# Patient Record
Sex: FEMALE | Race: WHITE | Employment: UNEMPLOYED | URBAN - METROPOLITAN AREA
[De-identification: names, ages, dates, MRNs, and addresses within clinical notes are randomized per-mention and may not be internally consistent; named-entity substitution may affect disease eponyms.]

---

## 2017-05-09 PROBLEM — R22.0 RIGHT FACIAL SWELLING: Status: ACTIVE | Noted: 2017-05-09

## 2018-03-01 PROBLEM — Z51.89 VISIT FOR WOUND CARE: Status: ACTIVE | Noted: 2018-03-01

## 2018-03-01 PROBLEM — R10.84 GENERALIZED ABDOMINAL PAIN: Status: ACTIVE | Noted: 2018-03-01

## 2018-03-01 PROBLEM — Z98.51 STATUS POST TUBAL LIGATION: Status: ACTIVE | Noted: 2018-03-01

## 2018-03-01 PROBLEM — R55 SYNCOPE AND COLLAPSE: Status: ACTIVE | Noted: 2018-03-01

## 2018-11-08 PROBLEM — T14.91XA SUICIDE ATTEMPT, INITIAL ENCOUNTER (HCC): Status: ACTIVE | Noted: 2018-11-08

## 2018-11-08 PROBLEM — T14.91XA SUICIDE ATTEMPT (HCC): Status: ACTIVE | Noted: 2018-11-08

## 2019-03-08 PROBLEM — T50.901A DRUG OVERDOSE, ACCIDENTAL OR UNINTENTIONAL, INITIAL ENCOUNTER: Status: ACTIVE | Noted: 2019-03-08

## 2019-03-10 PROBLEM — F32.A DEPRESSION WITH SUICIDAL IDEATION: Status: ACTIVE | Noted: 2019-03-10

## 2019-03-10 PROBLEM — R45.851 DEPRESSION WITH SUICIDAL IDEATION: Status: ACTIVE | Noted: 2019-03-10

## 2019-03-11 PROBLEM — F33.9 MDD (MAJOR DEPRESSIVE DISORDER), RECURRENT EPISODE (HCC): Status: ACTIVE | Noted: 2019-03-11

## 2019-04-18 PROBLEM — N70.11 HYDROSALPINX: Status: ACTIVE | Noted: 2019-04-18

## 2019-08-16 PROBLEM — T50.902A INTENTIONAL DRUG OVERDOSE (HCC): Status: ACTIVE | Noted: 2019-08-16

## 2019-11-15 PROBLEM — F32.9 MAJOR DEPRESSION, CHRONIC: Status: ACTIVE | Noted: 2019-11-15

## 2019-12-18 PROBLEM — Q44.5 BILE DUCT, COMMON, CYSTIC DILATATION: Status: ACTIVE | Noted: 2019-12-18

## 2021-04-30 PROBLEM — T50.905A DRUG-INDUCED TORSADES DE POINTES: Status: ACTIVE | Noted: 2021-04-30

## 2021-04-30 PROBLEM — I49.9 VENTRICULAR ARRHYTHMIA: Status: ACTIVE | Noted: 2021-04-30

## 2021-04-30 PROBLEM — I47.21 DRUG-INDUCED TORSADES DE POINTES: Status: ACTIVE | Noted: 2021-04-30

## 2022-02-21 PROBLEM — N20.0 RENAL CALCULI: Status: ACTIVE | Noted: 2022-02-21

## 2022-02-21 PROBLEM — N20.1 URETERAL CALCULI: Status: ACTIVE | Noted: 2022-02-21

## 2022-03-01 PROBLEM — R10.9 INTRACTABLE ABDOMINAL PAIN: Status: ACTIVE | Noted: 2022-03-01

## 2022-03-10 PROBLEM — N12 PYELONEPHRITIS: Status: ACTIVE | Noted: 2022-03-10

## 2022-03-14 PROBLEM — R10.9 INTRACTABLE ABDOMINAL PAIN: Status: ACTIVE | Noted: 2022-03-14

## 2022-06-21 PROBLEM — R19.7 DIARRHEA: Status: ACTIVE | Noted: 2022-06-21

## 2022-06-21 PROBLEM — R11.0 NAUSEA: Status: ACTIVE | Noted: 2022-06-21

## 2022-10-13 PROBLEM — R10.31 RIGHT LOWER QUADRANT ABDOMINAL PAIN: Status: ACTIVE | Noted: 2022-10-13

## 2022-11-28 ENCOUNTER — APPOINTMENT (OUTPATIENT)
Dept: ULTRASOUND IMAGING | Age: 32
End: 2022-11-28
Payer: COMMERCIAL

## 2022-11-28 ENCOUNTER — APPOINTMENT (OUTPATIENT)
Dept: GENERAL RADIOLOGY | Age: 32
End: 2022-11-28
Payer: COMMERCIAL

## 2022-11-28 ENCOUNTER — HOSPITAL ENCOUNTER (EMERGENCY)
Age: 32
Discharge: LEFT BEFORE DISCHARGE | End: 2022-11-28
Payer: COMMERCIAL

## 2022-11-28 VITALS
HEIGHT: 63 IN | WEIGHT: 196 LBS | TEMPERATURE: 97.7 F | HEART RATE: 100 BPM | OXYGEN SATURATION: 97 % | DIASTOLIC BLOOD PRESSURE: 75 MMHG | BODY MASS INDEX: 34.73 KG/M2 | RESPIRATION RATE: 20 BRPM | SYSTOLIC BLOOD PRESSURE: 114 MMHG

## 2022-11-28 DIAGNOSIS — J06.9 ACUTE UPPER RESPIRATORY INFECTION: ICD-10-CM

## 2022-11-28 DIAGNOSIS — R10.11 RIGHT UPPER QUADRANT ABDOMINAL PAIN: Primary | ICD-10-CM

## 2022-11-28 LAB
ALBUMIN SERPL-MCNC: 3.7 G/DL (ref 3.5–5.2)
ALP BLD-CCNC: 46 U/L (ref 35–104)
ALT SERPL-CCNC: 20 U/L (ref 0–32)
ANION GAP SERPL CALCULATED.3IONS-SCNC: 6 MMOL/L (ref 7–16)
AST SERPL-CCNC: 16 U/L (ref 0–31)
BASOPHILS ABSOLUTE: 0.06 E9/L (ref 0–0.2)
BASOPHILS RELATIVE PERCENT: 0.3 % (ref 0–2)
BILIRUB SERPL-MCNC: 0.3 MG/DL (ref 0–1.2)
BUN BLDV-MCNC: 14 MG/DL (ref 6–20)
CALCIUM SERPL-MCNC: 8.9 MG/DL (ref 8.6–10.2)
CHLORIDE BLD-SCNC: 105 MMOL/L (ref 98–107)
CO2: 23 MMOL/L (ref 22–29)
CREAT SERPL-MCNC: 0.6 MG/DL (ref 0.5–1)
D DIMER: <200 NG/ML DDU
EOSINOPHILS ABSOLUTE: 0.02 E9/L (ref 0.05–0.5)
EOSINOPHILS RELATIVE PERCENT: 0.1 % (ref 0–6)
GFR SERPL CREATININE-BSD FRML MDRD: >60 ML/MIN/1.73
GLUCOSE BLD-MCNC: 112 MG/DL (ref 74–99)
HCT VFR BLD CALC: 37.8 % (ref 34–48)
HEMOGLOBIN: 12.5 G/DL (ref 11.5–15.5)
IMMATURE GRANULOCYTES #: 0.1 E9/L
IMMATURE GRANULOCYTES %: 0.6 % (ref 0–5)
LYMPHOCYTES ABSOLUTE: 1.74 E9/L (ref 1.5–4)
LYMPHOCYTES RELATIVE PERCENT: 9.8 % (ref 20–42)
MCH RBC QN AUTO: 29.6 PG (ref 26–35)
MCHC RBC AUTO-ENTMCNC: 33.1 % (ref 32–34.5)
MCV RBC AUTO: 89.6 FL (ref 80–99.9)
MONOCYTES ABSOLUTE: 0.47 E9/L (ref 0.1–0.95)
MONOCYTES RELATIVE PERCENT: 2.7 % (ref 2–12)
NEUTROPHILS ABSOLUTE: 15.34 E9/L (ref 1.8–7.3)
NEUTROPHILS RELATIVE PERCENT: 86.5 % (ref 43–80)
PDW BLD-RTO: 12.8 FL (ref 11.5–15)
PLATELET # BLD: 315 E9/L (ref 130–450)
PMV BLD AUTO: 9.3 FL (ref 7–12)
POTASSIUM REFLEX MAGNESIUM: 4.8 MMOL/L (ref 3.5–5)
RBC # BLD: 4.22 E12/L (ref 3.5–5.5)
SARS-COV-2, NAAT: NOT DETECTED
SODIUM BLD-SCNC: 134 MMOL/L (ref 132–146)
TOTAL PROTEIN: 6.2 G/DL (ref 6.4–8.3)
TROPONIN, HIGH SENSITIVITY: <6 NG/L (ref 0–9)
WBC # BLD: 17.7 E9/L (ref 4.5–11.5)

## 2022-11-28 PROCEDURE — 85025 COMPLETE CBC W/AUTO DIFF WBC: CPT

## 2022-11-28 PROCEDURE — 6360000002 HC RX W HCPCS: Performed by: PHYSICIAN ASSISTANT

## 2022-11-28 PROCEDURE — 96374 THER/PROPH/DIAG INJ IV PUSH: CPT

## 2022-11-28 PROCEDURE — 85378 FIBRIN DEGRADE SEMIQUANT: CPT

## 2022-11-28 PROCEDURE — 87635 SARS-COV-2 COVID-19 AMP PRB: CPT

## 2022-11-28 PROCEDURE — 96361 HYDRATE IV INFUSION ADD-ON: CPT

## 2022-11-28 PROCEDURE — 71046 X-RAY EXAM CHEST 2 VIEWS: CPT

## 2022-11-28 PROCEDURE — 99284 EMERGENCY DEPT VISIT MOD MDM: CPT

## 2022-11-28 PROCEDURE — 80053 COMPREHEN METABOLIC PANEL: CPT

## 2022-11-28 PROCEDURE — 96375 TX/PRO/DX INJ NEW DRUG ADDON: CPT

## 2022-11-28 PROCEDURE — 2580000003 HC RX 258: Performed by: PHYSICIAN ASSISTANT

## 2022-11-28 PROCEDURE — 84484 ASSAY OF TROPONIN QUANT: CPT

## 2022-11-28 RX ORDER — MORPHINE SULFATE 4 MG/ML
4 INJECTION, SOLUTION INTRAMUSCULAR; INTRAVENOUS ONCE
Status: COMPLETED | OUTPATIENT
Start: 2022-11-28 | End: 2022-11-28

## 2022-11-28 RX ORDER — 0.9 % SODIUM CHLORIDE 0.9 %
1000 INTRAVENOUS SOLUTION INTRAVENOUS ONCE
Status: COMPLETED | OUTPATIENT
Start: 2022-11-28 | End: 2022-11-28

## 2022-11-28 RX ORDER — ONDANSETRON 2 MG/ML
4 INJECTION INTRAMUSCULAR; INTRAVENOUS ONCE
Status: COMPLETED | OUTPATIENT
Start: 2022-11-28 | End: 2022-11-28

## 2022-11-28 RX ADMIN — MORPHINE SULFATE 4 MG: 4 INJECTION, SOLUTION INTRAMUSCULAR; INTRAVENOUS at 16:37

## 2022-11-28 RX ADMIN — ONDANSETRON 4 MG: 2 INJECTION INTRAMUSCULAR; INTRAVENOUS at 16:36

## 2022-11-28 RX ADMIN — SODIUM CHLORIDE 1000 ML: 9 INJECTION, SOLUTION INTRAVENOUS at 16:41

## 2022-11-28 ASSESSMENT — PAIN DESCRIPTION - ORIENTATION: ORIENTATION: RIGHT;LOWER

## 2022-11-28 ASSESSMENT — PAIN DESCRIPTION - LOCATION: LOCATION: ABDOMEN;CHEST

## 2022-11-28 ASSESSMENT — PAIN SCALES - GENERAL: PAINLEVEL_OUTOF10: 9

## 2022-11-28 NOTE — ED PROVIDER NOTES
Independent [facility]                                                                                                                                    Department of Emergency Medicine   ED  Provider Note  Admit Date/RoomTime: 11/28/2022  3:09 PM  ED Room: DAWOOD/DAWOOD        HPI:  11/28/22,   Time: 3:30 PM SHAWN Trejo is a 34 y.o. female presenting to the ED for congestion, cough, vomiting and RUQ abdominal pain, beginning over a week ago. The complaint has been persistent, moderate in severity, and worsened by nothing. Does not work the patient states she was diagnosed with influenza A about a week and a half ago. She started noticing that she did start to feel better but then the cough got worse once again. The patient states that her boyfriend has heard some wheezing. She is a smoker but denies history of asthma or COPD. The patient states she has been using her inhalers more frequently. She has had bronchitis in the past.  Denies fever, chills or chest pain. Over the weekend she started having some nausea vomiting and right upper quadrant pain. The patient states that she still has her gallbladder. Bowels are moving normally. Denies hematuria, dysuria or polyuria.         ROS:     Constitutional: Negative for fever and chills  HENT: Negative for ear pain, sore throat and sinus pressure  Eyes: Negative for pain, discharge and redness  Respiratory:  See HPI  Cardiovascular: Negative for CP, edema or palpitations  Gastrointestinal:  See HPI  Genitourinary:  See HPI  Musculoskeletal: Negative for back pain and arthralgia  Skin: Negative for rash and wound  Neurological: Negative for weakness and headaches  Hematological: Negative for adenopathy    All other systems reviewed and are negative      -------------------------------- PAST HISTORY ----------------------------------  Past Medical History:  has a past medical history of Anxiety, Bipolar 1 disorder ([address] Concussion with brief loss of consciousness, Depression, GERD (gastroesophageal reflux disease), Hepatitis C, IBS (irritable bowel syndrome), IVDU (intravenous drug user), Neck pain, Paresthesia and pain of right extremity, Pelvic pain, Prolactin increased, PTSD (post-traumatic stress disorder), Renal calculi, Schizophrenia ([facility] [address] and Suicidal ideation. Past Surgical History:  has a past surgical history that includes Cholecystectomy; Tonsillectomy; Tubal ligation (03/01/2018); sinus surgery; Cystoscopy (05/20/2019); Lithotripsy (Right, 02/22/2022); other surgical history (10/06/2022); and salpingectomy (Bilateral, 10/06/2022). Social History:  reports that she has been smoking cigarettes. She has a 7.00 pack-year smoking history. She has never used smokeless tobacco. She reports that she does not currently use alcohol. She reports current drug use. Frequency: 1.00 time per week. Drugs: Opiates , Methamphetamines (Crystal Meth), and Marijuana (Swapna Mayfield). Family History: family history is not on file. She was adopted. The patients home medications have been reviewed. Allergies:  Toradol [ketorolac tromethamine], Adhesive tape, and Codeine    --------------------------------- RESULTS ------------------------------------------  All laboratory and radiology results have been personally reviewed by myself   LABS:  Results for orders placed or performed during the hospital encounter of 11/28/22   COVID-19, Rapid    Specimen: Nasopharyngeal Swab   Result Value Ref Range    SARS-CoV-2, NAAT Not Detected Not Detected   CBC with Auto Differential   Result Value Ref Range    WBC 17.7 (H) 4.5 - 11.5 E9/L    RBC 4.22 3.50 - 5.50 E12/L    Hemoglobin 12.5 11.5 - 15.5 g/dL    Hematocrit 37.8 34.0 - 48.0 %    MCV 89.6 80.0 - 99.9 fL    MCH 29.6 26.0 - 35.0 pg    MCHC 33.1 32.0 - 34.5 %    RDW 12.8 11.5 - 15.0 fL    Platelets 564 861 - 233 E9/L    MPV 9.3 7.0 - 12.0 fL    Neutrophils % 86.5 (H) 43.0 - 80.0 %    Immature Granulocytes % 0.6 0.0 - 5.0 % Lymphocytes % 9.8 (L) 20.0 - 42.0 %    Monocytes % 2.7 2.0 - 12.0 %    Eosinophils % 0.1 0.0 - 6.0 %    Basophils % 0.3 0.0 - 2.0 %    Neutrophils Absolute 15.34 (H) 1.80 - 7.30 E9/L    Immature Granulocytes # 0.10 E9/L    Lymphocytes Absolute 1.74 1.50 - 4.00 E9/L    Monocytes Absolute 0.47 0.10 - 0.95 E9/L    Eosinophils Absolute 0.02 (L) 0.05 - 0.50 E9/L    Basophils Absolute 0.06 0.00 - 0.20 E9/L   Comprehensive Metabolic Panel w/ Reflex to MG   Result Value Ref Range    Sodium 134 132 - 146 mmol/L    Potassium reflex Magnesium 4.8 3.5 - 5.0 mmol/L    Chloride 105 98 - 107 mmol/L    CO2 23 22 - 29 mmol/L    Anion Gap 6 (L) 7 - 16 mmol/L    Glucose 112 (H) 74 - 99 mg/dL    BUN 14 6 - 20 mg/dL    Creatinine 0.6 0.5 - 1.0 mg/dL    Est, Glom Filt Rate >60 >=60 mL/min/1.73    Calcium 8.9 8.6 - 10.2 mg/dL    Total Protein 6.2 (L) 6.4 - 8.3 g/dL    Albumin 3.7 3.5 - 5.2 g/dL    Total Bilirubin 0.3 0.0 - 1.2 mg/dL    Alkaline Phosphatase 46 35 - 104 U/L    ALT 20 0 - 32 U/L    AST 16 0 - 31 U/L   Troponin   Result Value Ref Range    Troponin, High Sensitivity <6 0 - 9 ng/L   D-Dimer, Quantitative   Result Value Ref Range    D-Dimer, Quant <200 ng/mL DDU       RADIOLOGY:  Interpreted by Radiologist.  XR CHEST (2 VW)   Final Result   No acute process. US ABDOMEN LIMITED    (Results Pending)       ----------------- NURSING NOTES AND VITALS REVIEWED ---------------   The nursing notes within the ED encounter and vital signs as below have been reviewed. /75   Pulse 100   Temp 97.7 °F (36.5 °C)   Resp 20   Ht 5' 3\" (1.6 m)   Wt 196 lb (88.9 kg)   SpO2 97%   BMI 34.72 kg/m²   Oxygen Saturation Interpretation: Normal      --------------------------------PHYSICAL EXAM------------------------------------      Constitutional/General: Alert and oriented x3, ill appearing. Nontoxic.      Head: NC/AT  Eyes: PERRL, EOMI  Mouth:  Dry oral mucosa  Neck: Supple, full ROM, no meningeal signs  Pulmonary: Lungs clear to auscultation bilaterally, no wheezes, rales, or rhonchi. Not in respiratory distress  Cardiovascular:  Regular rate and rhythm, no murmurs, gallops, or rubs. 2+ distal pulses  Abdomen: Soft, + BS. No distension. Pt is point tender in RUQ. No palpable rigidity, rebound or guarding  Extremities: Moves all extremities x 4. Warm and well perfused  Skin: warm and dry without rash  Neurologic: GCS 15,  Intact. No focal deficits  Psych: Normal Affect      ------------------------ ED COURSE/MEDICAL DECISION MAKING----------------------  Medications   0.9 % sodium chloride bolus (1,000 mLs IntraVENous New Bag 11/28/22 1641)   ondansetron (ZOFRAN) injection 4 mg (4 mg IntraVENous Given 11/28/22 1636)   morphine sulfate (PF) injection 4 mg (4 mg IntraVENous Given 11/28/22 1637)         Medical Decision Making:    The patient was seen and evaluated. Initial work-up was initiated including fluids, labs, urine chest x-ray and a right upper quadrant ultrasound. Tests were undertaken for influenza and COVID-19. The patient's white blood count was elevated. I went back to speak to her about some of these initial results and to see if she had been on any recent steroids. It was found that the patient had left the room and it pulled out her IV. There was a phone on the chair table and the patient was nowhere to be found in the department. Counseling:   None      ------------------------ IMPRESSION AND DISPOSITION -------------------------------    IMPRESSION  1. Right upper quadrant abdominal pain    2.  Acute upper respiratory infection        DISPOSITION  Disposition: Left prior to treatment complete  Patient condition is stable                    Elidia Smith PA-C  11/28/22 [id number]

## 2022-11-28 NOTE — ED NOTES
Entered room to check on Pt. Found pt not in room and IV catheter on floor.       Apollo Urbano LPN  74/29/39 [id number]

## 2022-12-20 ENCOUNTER — HOSPITAL ENCOUNTER (EMERGENCY)
Age: 32
Discharge: LEFT BEFORE DISCHARGE | End: 2022-12-20
Payer: COMMERCIAL

## 2022-12-20 VITALS
HEART RATE: 125 BPM | DIASTOLIC BLOOD PRESSURE: 83 MMHG | TEMPERATURE: 97.1 F | OXYGEN SATURATION: 100 % | RESPIRATION RATE: 16 BRPM | SYSTOLIC BLOOD PRESSURE: 142 MMHG

## 2022-12-20 DIAGNOSIS — R10.9 FLANK PAIN: Primary | ICD-10-CM

## 2022-12-20 LAB
ALBUMIN SERPL-MCNC: 3.9 G/DL (ref 3.5–5.2)
ALP BLD-CCNC: 55 U/L (ref 35–104)
ALT SERPL-CCNC: 26 U/L (ref 0–32)
ANION GAP SERPL CALCULATED.3IONS-SCNC: 10 MMOL/L (ref 7–16)
AST SERPL-CCNC: 19 U/L (ref 0–31)
BACTERIA: NORMAL /HPF
BASOPHILS ABSOLUTE: 0.02 E9/L (ref 0–0.2)
BASOPHILS RELATIVE PERCENT: 0.2 % (ref 0–2)
BILIRUB SERPL-MCNC: 1 MG/DL (ref 0–1.2)
BILIRUBIN URINE: NEGATIVE
BLOOD, URINE: NEGATIVE
BUN BLDV-MCNC: 16 MG/DL (ref 6–20)
CALCIUM SERPL-MCNC: 9.1 MG/DL (ref 8.6–10.2)
CHLORIDE BLD-SCNC: 104 MMOL/L (ref 98–107)
CLARITY: CLEAR
CO2: 20 MMOL/L (ref 22–29)
COLOR: YELLOW
CREAT SERPL-MCNC: 0.6 MG/DL (ref 0.5–1)
EOSINOPHILS ABSOLUTE: 0.07 E9/L (ref 0.05–0.5)
EOSINOPHILS RELATIVE PERCENT: 0.7 % (ref 0–6)
GFR SERPL CREATININE-BSD FRML MDRD: >60 ML/MIN/1.73
GLUCOSE BLD-MCNC: 88 MG/DL (ref 74–99)
GLUCOSE URINE: NEGATIVE MG/DL
HCG(URINE) PREGNANCY TEST: NEGATIVE
HCT VFR BLD CALC: 36.5 % (ref 34–48)
HEMOGLOBIN: 12.4 G/DL (ref 11.5–15.5)
IMMATURE GRANULOCYTES #: 0.04 E9/L
IMMATURE GRANULOCYTES %: 0.4 % (ref 0–5)
KETONES, URINE: NEGATIVE MG/DL
LEUKOCYTE ESTERASE, URINE: ABNORMAL
LYMPHOCYTES ABSOLUTE: 1.73 E9/L (ref 1.5–4)
LYMPHOCYTES RELATIVE PERCENT: 18.1 % (ref 20–42)
MCH RBC QN AUTO: 29.2 PG (ref 26–35)
MCHC RBC AUTO-ENTMCNC: 34 % (ref 32–34.5)
MCV RBC AUTO: 85.9 FL (ref 80–99.9)
MONOCYTES ABSOLUTE: 0.54 E9/L (ref 0.1–0.95)
MONOCYTES RELATIVE PERCENT: 5.6 % (ref 2–12)
NEUTROPHILS ABSOLUTE: 7.18 E9/L (ref 1.8–7.3)
NEUTROPHILS RELATIVE PERCENT: 75 % (ref 43–80)
NITRITE, URINE: NEGATIVE
PDW BLD-RTO: 12.6 FL (ref 11.5–15)
PH UA: 6 (ref 5–9)
PLATELET # BLD: 342 E9/L (ref 130–450)
PMV BLD AUTO: 9.2 FL (ref 7–12)
POTASSIUM REFLEX MAGNESIUM: 4.4 MMOL/L (ref 3.5–5)
PROTEIN UA: NEGATIVE MG/DL
RBC # BLD: 4.25 E12/L (ref 3.5–5.5)
RBC UA: NORMAL /HPF (ref 0–2)
RENAL EPITHELIAL, UA: NORMAL /HPF
SODIUM BLD-SCNC: 134 MMOL/L (ref 132–146)
SPECIFIC GRAVITY UA: >=1.03 (ref 1–1.03)
TOTAL PROTEIN: 6.9 G/DL (ref 6.4–8.3)
UROBILINOGEN, URINE: 0.2 E.U./DL
WBC # BLD: 9.6 E9/L (ref 4.5–11.5)
WBC UA: NORMAL /HPF (ref 0–5)

## 2022-12-20 PROCEDURE — 80053 COMPREHEN METABOLIC PANEL: CPT

## 2022-12-20 PROCEDURE — 81025 URINE PREGNANCY TEST: CPT

## 2022-12-20 PROCEDURE — 6370000000 HC RX 637 (ALT 250 FOR IP)

## 2022-12-20 PROCEDURE — 99283 EMERGENCY DEPT VISIT LOW MDM: CPT

## 2022-12-20 PROCEDURE — 85025 COMPLETE CBC W/AUTO DIFF WBC: CPT

## 2022-12-20 PROCEDURE — 81001 URINALYSIS AUTO W/SCOPE: CPT

## 2022-12-20 RX ORDER — ONDANSETRON 4 MG/1
4 TABLET, ORALLY DISINTEGRATING ORAL ONCE
Status: COMPLETED | OUTPATIENT
Start: 2022-12-20 | End: 2022-12-20

## 2022-12-20 RX ORDER — OXYCODONE HYDROCHLORIDE AND ACETAMINOPHEN 5; 325 MG/1; MG/1
1 TABLET ORAL ONCE
Status: COMPLETED | OUTPATIENT
Start: 2022-12-20 | End: 2022-12-20

## 2022-12-20 RX ORDER — 0.9 % SODIUM CHLORIDE 0.9 %
1000 INTRAVENOUS SOLUTION INTRAVENOUS ONCE
Status: DISCONTINUED | OUTPATIENT
Start: 2022-12-20 | End: 2022-12-20 | Stop reason: HOSPADM

## 2022-12-20 RX ADMIN — OXYCODONE AND ACETAMINOPHEN 1 TABLET: 5; 325 TABLET ORAL at 10:10

## 2022-12-20 RX ADMIN — ONDANSETRON 4 MG: 4 TABLET, ORALLY DISINTEGRATING ORAL at 10:10

## 2022-12-20 ASSESSMENT — PAIN DESCRIPTION - DESCRIPTORS: DESCRIPTORS: CRAMPING;STABBING

## 2022-12-20 ASSESSMENT — PAIN DESCRIPTION - LOCATION: LOCATION: ABDOMEN

## 2022-12-20 ASSESSMENT — PAIN DESCRIPTION - ORIENTATION: ORIENTATION: RIGHT;LOWER

## 2022-12-20 ASSESSMENT — PAIN SCALES - GENERAL
PAINLEVEL_OUTOF10: 9
PAINLEVEL_OUTOF10: 9

## 2022-12-20 NOTE — ED PROVIDER NOTES
INDEPENDENT MLP  HPI:  12/20/22,   Time: 3:00 PM SHAWN Bess is a 34 y.o. female presenting to the ED for right flank pain, beginning few days ago. The complaint has been persistent, mild in severity, and worsened by nothing. Presents for complaints of right flank pain which she states began a few days ago she reports a history of kidney stones and concerned she may have another. She reports nausea but denies any vomiting or diarrhea. She states her urine has been dark in color. Denies any chest pain or shortness of breath    ROS:   Pertinent positives and negatives are stated within HPI, all other systems reviewed and are negative.   --------------------------------------------- PAST HISTORY ---------------------------------------------  Past Medical History:  has a past medical history of Anxiety, Bipolar 1 disorder ([facility] [address] Concussion with brief loss of consciousness, Depression, GERD (gastroesophageal reflux disease), Hepatitis C, IBS (irritable bowel syndrome), IVDU (intravenous drug user), Neck pain, Paresthesia and pain of right extremity, Pelvic pain, Prolactin increased, PTSD (post-traumatic stress disorder), Renal calculi, [facility] and Suicidal ideation. Past Surgical History:  has a past surgical history that includes Cholecystectomy; Tonsillectomy; Tubal ligation (03/01/2018); sinus surgery; Cystoscopy (05/20/2019); Lithotripsy (Right, 02/22/2022); other surgical history (10/06/2022); and salpingectomy (Bilateral, 10/06/2022). Social History:  reports that she has been smoking cigarettes. She has a 7.00 pack-year smoking history. She has never used smokeless tobacco. She reports that she does not currently use alcohol. She reports current drug use. Frequency: 1.00 time per week. Drugs: Opiates , Methamphetamines (Crystal Meth), and Marijuana (Lorence Rockwall). Family History: family history is not on file. She was adopted.      The patients home medications have been reviewed. Allergies:  Toradol [ketorolac tromethamine], Adhesive tape, and Codeine    -------------------------------------------------- RESULTS -------------------------------------------------  All laboratory and radiology results have been personally reviewed by myself   LABS:  Results for orders placed or performed during the hospital encounter of 12/20/22   Urinalysis   Result Value Ref Range    Color, UA Yellow Straw/Yellow    Clarity, UA Clear Clear    Glucose, Ur Negative Negative mg/dL    Bilirubin Urine Negative Negative    Ketones, Urine Negative Negative mg/dL    Specific Gravity, UA >=1.030 1.005 - 1.030    Blood, Urine Negative Negative    pH, UA 6.0 5.0 - 9.0    Protein, UA Negative Negative mg/dL    Urobilinogen, Urine 0.2 <2.0 E.U./dL    Nitrite, Urine Negative Negative    Leukocyte Esterase, Urine TRACE (A) Negative   CBC with Auto Differential   Result Value Ref Range    WBC 9.6 4.5 - 11.5 E9/L    RBC 4.25 3.50 - 5.50 E12/L    Hemoglobin 12.4 11.5 - 15.5 g/dL    Hematocrit 36.5 34.0 - 48.0 %    MCV 85.9 80.0 - 99.9 fL    MCH 29.2 26.0 - 35.0 pg    MCHC 34.0 32.0 - 34.5 %    RDW 12.6 11.5 - 15.0 fL    Platelets 222 952 - 333 E9/L    MPV 9.2 7.0 - 12.0 fL    Neutrophils % 75.0 43.0 - 80.0 %    Immature Granulocytes % 0.4 0.0 - 5.0 %    Lymphocytes % 18.1 (L) 20.0 - 42.0 %    Monocytes % 5.6 2.0 - 12.0 %    Eosinophils % 0.7 0.0 - 6.0 %    Basophils % 0.2 0.0 - 2.0 %    Neutrophils Absolute 7.18 1.80 - 7.30 E9/L    Immature Granulocytes # 0.04 E9/L    Lymphocytes Absolute 1.73 1.50 - 4.00 E9/L    Monocytes Absolute 0.54 0.10 - 0.95 E9/L    Eosinophils Absolute 0.07 0.05 - 0.50 E9/L    Basophils Absolute 0.02 0.00 - 0.20 E9/L   Comprehensive Metabolic Panel w/ Reflex to MG   Result Value Ref Range    Sodium 134 132 - 146 mmol/L    Potassium reflex Magnesium 4.4 3.5 - 5.0 mmol/L    Chloride 104 98 - 107 mmol/L    CO2 20 (L) 22 - 29 mmol/L    Anion Gap 10 7 - 16 mmol/L    Glucose 88 74 - 99 mg/dL BUN 16 6 - 20 mg/dL    Creatinine 0.6 0.5 - 1.0 mg/dL    Est, Glom Filt Rate >60 >=60 mL/min/1.73    Calcium 9.1 8.6 - 10.2 mg/dL    Total Protein 6.9 6.4 - 8.3 g/dL    Albumin 3.9 3.5 - 5.2 g/dL    Total Bilirubin 1.0 0.0 - 1.2 mg/dL    Alkaline Phosphatase 55 35 - 104 U/L    ALT 26 0 - 32 U/L    AST 19 0 - 31 U/L   Microscopic Urinalysis   Result Value Ref Range    WBC, UA 1-3 0 - 5 /HPF    RBC, UA NONE 0 - 2 /HPF    Renal Epithelial, UA FEW /HPF    Bacteria, UA NONE SEEN None Seen /HPF   Pregnancy, urine   Result Value Ref Range    HCG(Urine) Pregnancy Test NEGATIVE NEGATIVE       RADIOLOGY:  Interpreted by Radiologist.  No orders to display       ------------------------- NURSING NOTES AND VITALS REVIEWED ---------------------------   The nursing notes within the ED encounter and vital signs as below have been reviewed. BP (!) 142/83   Pulse (!) 125   Temp 97.1 °F (36.2 °C) (Temporal)   Resp 16   LMP 12/08/2022   SpO2 100%   Oxygen Saturation Interpretation: Normal      ---------------------------------------------------PHYSICAL EXAM--------------------------------------      Constitutional/General: Alert and oriented x3, well appearing, non toxic in NAD  Head: NC/AT  Eyes: PERRL, EOMI  Mouth: Oropharynx clear, handling secretions, no trismus  Neck: Supple, full ROM, no meningeal signs  Pulmonary: Lungs clear to auscultation bilaterally, no wheezes, rales, or rhonchi. Not in respiratory distress  Cardiovascular:  Regular rate and rhythm, no murmurs, gallops, or rubs. 2+ distal pulses  Abdomen: Soft,  non distended, right CVA tenderness. Extremities: Moves all extremities x 4.  Warm and well perfused  Skin: warm and dry without rash  Neurologic: GCS 15,  Psych: Normal Affect      ------------------------------ ED COURSE/MEDICAL DECISION MAKING----------------------  Medications   0.9 % sodium chloride bolus (has no administration in time range)   ondansetron (ZOFRAN-ODT) disintegrating tablet 4 mg (4 mg Oral Given 22 1010)   oxyCODONE-acetaminophen (PERCOCET) 5-325 MG per tablet 1 tablet (1 tablet Oral Given 22 1010)         Medical Decision Makin- Attempting to take patient to CAT scan she was unable to be located or found in the department apparently eloped. Counseling: The emergency provider has spoken with the patient and discussed todays results, in addition to providing specific details for the plan of care and counseling regarding the diagnosis and prognosis. Questions are answered at this time and they are agreeable with the plan.      --------------------------------- IMPRESSION AND DISPOSITION ---------------------------------    IMPRESSION  1.  Flank pain        DISPOSITION  Disposition: eloped  Patient condition is stable                   VELIA Chan - CNP  22 [id number]

## 2022-12-20 NOTE — PROGRESS NOTES
2 calls made for pt with no response. Pt was not in the restroom outside or any diagnostic testing.  Triage RN made aware

## 2022-12-20 NOTE — ED NOTES
Department of Emergency Medicine  FIRST PROVIDER TRIAGE NOTE             Independent MLP           12/20/22  10:07 AM EST    Date of Encounter: 12/20/22   MRN: [id number]      HPI: Sharri Gordon is a 34 y.o. female who presents to the ED for Abdominal Pain (Pt reports RLQ pain for 4 days. Pt reports nausea, vomiting, and dark urine. Hx of kidney stones. )  Complains of right flank pain and RLQ pain. Denies pain and burning with urination, does report increased urgency. ROS: Negative for cp or sob. PE: Gen Appearance/Constitutional: alert  HEENT: NC/NT. PERRLA,  Airway patent. Initial Plan of Care: All treatment areas with department are currently occupied. Plan to order/Initiate the following while awaiting opening in ED: labs and imaging studies.   Initiate Treatment-Testing, Proceed toTreatment Area When Bed Available for ED Attending/MLP to Continue Care    Electronically signed by VELIA Ibarra CNP   DD: 12/20/22       VELIA Ibarra CNP  12/20/22 1011

## 2023-01-30 ENCOUNTER — HOSPITAL ENCOUNTER (EMERGENCY)
Age: 33
Discharge: LEFT BEFORE DISCHARGE | End: 2023-01-30

## 2023-01-30 VITALS
DIASTOLIC BLOOD PRESSURE: 97 MMHG | OXYGEN SATURATION: 100 % | RESPIRATION RATE: 16 BRPM | WEIGHT: 210 LBS | HEIGHT: 63 IN | SYSTOLIC BLOOD PRESSURE: 117 MMHG | HEART RATE: 120 BPM | TEMPERATURE: 98.2 F | BODY MASS INDEX: 37.21 KG/M2

## 2023-01-30 RX ORDER — 0.9 % SODIUM CHLORIDE 0.9 %
1000 INTRAVENOUS SOLUTION INTRAVENOUS ONCE
Status: DISCONTINUED | OUTPATIENT
Start: 2023-01-30 | End: 2023-01-30 | Stop reason: HOSPADM

## 2023-01-30 ASSESSMENT — PAIN SCALES - GENERAL: PAINLEVEL_OUTOF10: 8

## 2023-01-30 ASSESSMENT — PAIN - FUNCTIONAL ASSESSMENT: PAIN_FUNCTIONAL_ASSESSMENT: 0-10

## 2023-01-30 ASSESSMENT — PAIN DESCRIPTION - ONSET: ONSET: ON-GOING

## 2023-01-30 ASSESSMENT — PAIN DESCRIPTION - FREQUENCY: FREQUENCY: CONTINUOUS

## 2023-01-30 ASSESSMENT — PAIN DESCRIPTION - DESCRIPTORS: DESCRIPTORS: DISCOMFORT

## 2023-01-30 ASSESSMENT — PAIN DESCRIPTION - LOCATION: LOCATION: HEAD;NECK

## 2023-01-30 ASSESSMENT — PAIN DESCRIPTION - PAIN TYPE: TYPE: ACUTE PAIN

## 2023-01-30 NOTE — ED TRIAGE NOTES
Department of Emergency Medicine  FIRST PROVIDER TRIAGE NOTE             Independent MLP           1/30/23  5:50 PM EST    Date of Encounter: 1/30/23   MRN: [id number]      HPI: Roosevelt Haas is a 34 y.o. female who presents to the ED for Ear Drainage (Left ear bleeding), Neck Pain, and Headache (Beginning yesterday)   Cough for months. No fever. Taking ibuprofen    ROS: Negative for cp or fever. PE: Gen Appearance/Constitutional: alert  HEENT: NC/NT. PERRLA,  Airway patent. Initial Plan of Care: All treatment areas with department are currently occupied. Plan to order/Initiate the following while awaiting opening in ED: COVID-19 testing.   Initiate Treatment-Testing, Proceed toTreatment Area When Bed Available for ED Attending/MLP to Continue Care    Electronically signed by VELIA Hammer CNP   DD: 1/30/23

## 2023-01-31 NOTE — ED NOTES
Ed withdraw of treatment form signed. Patient states \"I have a job interview in the morning so I need to leave.\" States she will return if she needs to.     David Rachel RN  01/30/23 1934

## 2023-09-07 ENCOUNTER — HOSPITAL ENCOUNTER (EMERGENCY)
Age: 33
Discharge: PSYCHIATRIC OR SUBSTANCE USE FACILITY | End: 2023-09-08
Attending: EMERGENCY MEDICINE
Payer: COMMERCIAL

## 2023-09-07 VITALS
OXYGEN SATURATION: 97 % | RESPIRATION RATE: 20 BRPM | SYSTOLIC BLOOD PRESSURE: 102 MMHG | DIASTOLIC BLOOD PRESSURE: 72 MMHG | HEART RATE: 88 BPM | BODY MASS INDEX: 37.21 KG/M2 | TEMPERATURE: 98.2 F | WEIGHT: 210 LBS | HEIGHT: 63 IN

## 2023-09-07 DIAGNOSIS — R45.851 SUICIDAL IDEATION: Primary | ICD-10-CM

## 2023-09-07 LAB
ALBUMIN SERPL-MCNC: 4.3 G/DL (ref 3.5–5.2)
ALBUMIN/GLOB SERPL: 1.6 {RATIO} (ref 1–2.5)
ALP SERPL-CCNC: 50 U/L (ref 35–104)
ALT SERPL-CCNC: 16 U/L (ref 5–33)
AMPHET UR QL SCN: NEGATIVE
ANION GAP SERPL CALCULATED.3IONS-SCNC: 10 MMOL/L (ref 9–17)
APAP SERPL-MCNC: <5 UG/ML (ref 10–30)
AST SERPL-CCNC: 14 U/L
BARBITURATES UR QL SCN: NEGATIVE
BASOPHILS # BLD: 0.04 K/UL (ref 0–0.2)
BASOPHILS NFR BLD: 0 % (ref 0–2)
BENZODIAZ UR QL: NEGATIVE
BILIRUB SERPL-MCNC: 1.3 MG/DL (ref 0.3–1.2)
BUN SERPL-MCNC: 12 MG/DL (ref 6–20)
BUN/CREAT SERPL: 24 (ref 9–20)
BUPRENORPHINE UR QL: NEGATIVE
CALCIUM SERPL-MCNC: 9.2 MG/DL (ref 8.6–10.4)
CANNABINOIDS UR QL SCN: NEGATIVE
CHLORIDE SERPL-SCNC: 104 MMOL/L (ref 98–107)
CO2 SERPL-SCNC: 22 MMOL/L (ref 20–31)
COCAINE UR QL SCN: NEGATIVE
CREAT SERPL-MCNC: 0.5 MG/DL (ref 0.5–0.9)
EOSINOPHIL # BLD: 0.04 K/UL (ref 0–0.44)
EOSINOPHILS RELATIVE PERCENT: 0 % (ref 1–4)
ERYTHROCYTE [DISTWIDTH] IN BLOOD BY AUTOMATED COUNT: 13.1 % (ref 11.8–14.4)
ETHANOL PERCENT: <0.01 %
ETHANOLAMINE SERPL-MCNC: <10 MG/DL
FENTANYL UR QL: NEGATIVE
GFR SERPL CREATININE-BSD FRML MDRD: >60 ML/MIN/1.73M2
GLUCOSE SERPL-MCNC: 85 MG/DL (ref 70–99)
HCG SERPL QL: NEGATIVE
HCT VFR BLD AUTO: 38.6 % (ref 36.3–47.1)
HGB BLD-MCNC: 13.1 G/DL (ref 11.9–15.1)
IMM GRANULOCYTES # BLD AUTO: 0.03 K/UL (ref 0–0.3)
IMM GRANULOCYTES NFR BLD: 0 %
LYMPHOCYTES NFR BLD: 2.27 K/UL (ref 1.1–3.7)
LYMPHOCYTES RELATIVE PERCENT: 22 % (ref 24–43)
MCH RBC QN AUTO: 29.6 PG (ref 25.2–33.5)
MCHC RBC AUTO-ENTMCNC: 33.9 G/DL (ref 28.4–34.8)
MCV RBC AUTO: 87.1 FL (ref 82.6–102.9)
METHADONE UR QL: NEGATIVE
MONOCYTES NFR BLD: 0.85 K/UL (ref 0.1–1.2)
MONOCYTES NFR BLD: 8 % (ref 3–12)
NEUTROPHILS NFR BLD: 70 % (ref 36–65)
NEUTS SEG NFR BLD: 7.32 K/UL (ref 1.5–8.1)
NRBC BLD-RTO: 0 PER 100 WBC
OPIATES UR QL SCN: NEGATIVE
OXYCODONE UR QL SCN: NEGATIVE
PCP UR QL SCN: NEGATIVE
PLATELET # BLD AUTO: 299 K/UL (ref 138–453)
PMV BLD AUTO: 10.3 FL (ref 8.1–13.5)
POTASSIUM SERPL-SCNC: 4.4 MMOL/L (ref 3.7–5.3)
PROT SERPL-MCNC: 7 G/DL (ref 6.4–8.3)
RBC # BLD AUTO: 4.43 M/UL (ref 3.95–5.11)
SALICYLATES SERPL-MCNC: <1 MG/DL (ref 3–10)
SARS-COV-2 RDRP RESP QL NAA+PROBE: NOT DETECTED
SODIUM SERPL-SCNC: 136 MMOL/L (ref 135–144)
SPECIMEN DESCRIPTION: NORMAL
TEST INFORMATION: NORMAL
WBC OTHER # BLD: 10.6 K/UL (ref 3.5–11.3)

## 2023-09-07 PROCEDURE — 87635 SARS-COV-2 COVID-19 AMP PRB: CPT

## 2023-09-07 PROCEDURE — 96372 THER/PROPH/DIAG INJ SC/IM: CPT

## 2023-09-07 PROCEDURE — 80143 DRUG ASSAY ACETAMINOPHEN: CPT

## 2023-09-07 PROCEDURE — C9803 HOPD COVID-19 SPEC COLLECT: HCPCS

## 2023-09-07 PROCEDURE — 80179 DRUG ASSAY SALICYLATE: CPT

## 2023-09-07 PROCEDURE — 80307 DRUG TEST PRSMV CHEM ANLYZR: CPT

## 2023-09-07 PROCEDURE — 80053 COMPREHEN METABOLIC PANEL: CPT

## 2023-09-07 PROCEDURE — 93005 ELECTROCARDIOGRAM TRACING: CPT | Performed by: EMERGENCY MEDICINE

## 2023-09-07 PROCEDURE — 6370000000 HC RX 637 (ALT 250 FOR IP): Performed by: EMERGENCY MEDICINE

## 2023-09-07 PROCEDURE — 36415 COLL VENOUS BLD VENIPUNCTURE: CPT

## 2023-09-07 PROCEDURE — 85025 COMPLETE CBC W/AUTO DIFF WBC: CPT

## 2023-09-07 PROCEDURE — 99285 EMERGENCY DEPT VISIT HI MDM: CPT

## 2023-09-07 PROCEDURE — 84703 CHORIONIC GONADOTROPIN ASSAY: CPT

## 2023-09-07 PROCEDURE — 6360000002 HC RX W HCPCS

## 2023-09-07 PROCEDURE — G0480 DRUG TEST DEF 1-7 CLASSES: HCPCS

## 2023-09-07 RX ORDER — LORAZEPAM 2 MG/ML
INJECTION INTRAMUSCULAR
Status: COMPLETED
Start: 2023-09-07 | End: 2023-09-07

## 2023-09-07 RX ORDER — CLONAZEPAM 2 MG/1
2 TABLET ORAL 3 TIMES DAILY
Status: ON HOLD | COMMUNITY
End: 2023-09-13 | Stop reason: HOSPADM

## 2023-09-07 RX ORDER — LORAZEPAM 2 MG/ML
2 INJECTION INTRAMUSCULAR ONCE
Status: COMPLETED | OUTPATIENT
Start: 2023-09-07 | End: 2023-09-07

## 2023-09-07 RX ORDER — LORAZEPAM 1 MG/1
1 TABLET ORAL ONCE
Status: COMPLETED | OUTPATIENT
Start: 2023-09-07 | End: 2023-09-07

## 2023-09-07 RX ADMIN — LORAZEPAM 1 MG: 1 TABLET ORAL at 20:03

## 2023-09-07 RX ADMIN — LORAZEPAM 2 MG: 2 INJECTION INTRAMUSCULAR at 21:28

## 2023-09-07 RX ADMIN — LORAZEPAM 2 MG: 2 INJECTION, SOLUTION INTRAMUSCULAR; INTRAVENOUS at 21:28

## 2023-09-07 ASSESSMENT — PAIN - FUNCTIONAL ASSESSMENT: PAIN_FUNCTIONAL_ASSESSMENT: NONE - DENIES PAIN

## 2023-09-07 NOTE — ED NOTES
Accepted at [facility]- Waiting on bed assignment      Carmen Heard  09/07/23 1928

## 2023-09-08 ENCOUNTER — HOSPITAL ENCOUNTER (INPATIENT)
Age: 33
LOS: 6 days | Discharge: HOME | DRG: 750 | End: 2023-09-14
Attending: PSYCHIATRY & NEUROLOGY | Admitting: PSYCHIATRY & NEUROLOGY
Payer: COMMERCIAL

## 2023-09-08 LAB
EKG ATRIAL RATE: 88 BPM
EKG P AXIS: 43 DEGREES
EKG P-R INTERVAL: 106 MS
EKG Q-T INTERVAL: 362 MS
EKG QRS DURATION: 58 MS
EKG QTC CALCULATION (BAZETT): 438 MS
EKG R AXIS: 24 DEGREES
EKG T AXIS: 6 DEGREES
EKG VENTRICULAR RATE: 88 BPM

## 2023-09-08 PROCEDURE — 1240000000 HC EMOTIONAL WELLNESS R&B

## 2023-09-08 PROCEDURE — 93010 ELECTROCARDIOGRAM REPORT: CPT | Performed by: INTERNAL MEDICINE

## 2023-09-08 PROCEDURE — 90792 PSYCH DIAG EVAL W/MED SRVCS: CPT | Performed by: PSYCHIATRY & NEUROLOGY

## 2023-09-08 PROCEDURE — 6370000000 HC RX 637 (ALT 250 FOR IP): Performed by: PSYCHIATRY & NEUROLOGY

## 2023-09-08 PROCEDURE — 99222 1ST HOSP IP/OBS MODERATE 55: CPT | Performed by: INTERNAL MEDICINE

## 2023-09-08 RX ORDER — HYDROXYZINE 50 MG/1
50 TABLET, FILM COATED ORAL 3 TIMES DAILY PRN
Status: DISCONTINUED | OUTPATIENT
Start: 2023-09-08 | End: 2023-09-14 | Stop reason: HOSPADM

## 2023-09-08 RX ORDER — MAGNESIUM HYDROXIDE/ALUMINUM HYDROXICE/SIMETHICONE 120; 1200; 1200 MG/30ML; MG/30ML; MG/30ML
30 SUSPENSION ORAL EVERY 6 HOURS PRN
Status: DISCONTINUED | OUTPATIENT
Start: 2023-09-08 | End: 2023-09-14 | Stop reason: HOSPADM

## 2023-09-08 RX ORDER — NICOTINE 21 MG/24HR
1 PATCH, TRANSDERMAL 24 HOURS TRANSDERMAL DAILY
Status: DISCONTINUED | OUTPATIENT
Start: 2023-09-08 | End: 2023-09-14 | Stop reason: HOSPADM

## 2023-09-08 RX ORDER — ARIPIPRAZOLE 10 MG/1
10 TABLET ORAL DAILY
Status: ON HOLD | COMMUNITY
End: 2023-09-13 | Stop reason: HOSPADM

## 2023-09-08 RX ORDER — HYDROXYZINE 50 MG/1
50 TABLET, FILM COATED ORAL 3 TIMES DAILY PRN
Status: ON HOLD | COMMUNITY
End: 2023-09-13 | Stop reason: SDUPTHER

## 2023-09-08 RX ORDER — TRAZODONE HYDROCHLORIDE 50 MG/1
50 TABLET ORAL NIGHTLY PRN
Status: DISCONTINUED | OUTPATIENT
Start: 2023-09-08 | End: 2023-09-14 | Stop reason: HOSPADM

## 2023-09-08 RX ORDER — PALIPERIDONE 3 MG/1
3 TABLET, EXTENDED RELEASE ORAL DAILY
Status: DISCONTINUED | OUTPATIENT
Start: 2023-09-08 | End: 2023-09-09

## 2023-09-08 RX ORDER — POLYETHYLENE GLYCOL 3350 17 G/17G
17 POWDER, FOR SOLUTION ORAL DAILY PRN
Status: DISCONTINUED | OUTPATIENT
Start: 2023-09-08 | End: 2023-09-14 | Stop reason: HOSPADM

## 2023-09-08 RX ORDER — ACETAMINOPHEN 325 MG/1
650 TABLET ORAL EVERY 4 HOURS PRN
Status: DISCONTINUED | OUTPATIENT
Start: 2023-09-08 | End: 2023-09-14 | Stop reason: HOSPADM

## 2023-09-08 RX ORDER — ALPRAZOLAM 0.5 MG/1
0.5 TABLET ORAL 2 TIMES DAILY PRN
Status: ON HOLD | COMMUNITY
End: 2023-09-13 | Stop reason: HOSPADM

## 2023-09-08 RX ORDER — CLONIDINE HYDROCHLORIDE 0.1 MG/1
0.1 TABLET ORAL NIGHTLY
Status: DISCONTINUED | OUTPATIENT
Start: 2023-09-08 | End: 2023-09-10

## 2023-09-08 RX ORDER — PRAZOSIN HYDROCHLORIDE 1 MG/1
1 CAPSULE ORAL NIGHTLY
Status: ON HOLD | COMMUNITY
End: 2023-09-13 | Stop reason: HOSPADM

## 2023-09-08 RX ORDER — TRAZODONE HYDROCHLORIDE 50 MG/1
50 TABLET ORAL NIGHTLY PRN
Status: ON HOLD | COMMUNITY
End: 2023-09-13 | Stop reason: SDUPTHER

## 2023-09-08 RX ADMIN — PALIPERIDONE 3 MG: 3 TABLET, EXTENDED RELEASE ORAL at 14:02

## 2023-09-08 RX ADMIN — ACETAMINOPHEN 650 MG: 325 TABLET ORAL at 18:22

## 2023-09-08 RX ADMIN — HYDROXYZINE HYDROCHLORIDE 50 MG: 50 TABLET, FILM COATED ORAL at 17:59

## 2023-09-08 RX ADMIN — HYDROXYZINE HYDROCHLORIDE 50 MG: 50 TABLET, FILM COATED ORAL at 01:09

## 2023-09-08 RX ADMIN — TRAZODONE HYDROCHLORIDE 50 MG: 50 TABLET ORAL at 20:55

## 2023-09-08 RX ADMIN — HYDROXYZINE HYDROCHLORIDE 50 MG: 50 TABLET, FILM COATED ORAL at 12:10

## 2023-09-08 RX ADMIN — CLONIDINE HYDROCHLORIDE 0.1 MG: 0.1 TABLET ORAL at 20:54

## 2023-09-08 RX ADMIN — ACETAMINOPHEN 650 MG: 325 TABLET ORAL at 01:09

## 2023-09-08 RX ADMIN — TRAZODONE HYDROCHLORIDE 50 MG: 50 TABLET ORAL at 01:09

## 2023-09-08 ASSESSMENT — LIFESTYLE VARIABLES
HOW MANY STANDARD DRINKS CONTAINING ALCOHOL DO YOU HAVE ON A TYPICAL DAY: PATIENT DOES NOT DRINK
HOW OFTEN DO YOU HAVE A DRINK CONTAINING ALCOHOL: NEVER

## 2023-09-08 ASSESSMENT — PAIN DESCRIPTION - LOCATION
LOCATION: HEAD
LOCATION: HEAD

## 2023-09-08 ASSESSMENT — PATIENT HEALTH QUESTIONNAIRE - PHQ9
1. LITTLE INTEREST OR PLEASURE IN DOING THINGS: 2
2. FEELING DOWN, DEPRESSED OR HOPELESS: 2
SUM OF ALL RESPONSES TO PHQ QUESTIONS 1-9: 13
7. TROUBLE CONCENTRATING ON THINGS, SUCH AS READING THE NEWSPAPER OR WATCHING TELEVISION: 1
SUM OF ALL RESPONSES TO PHQ QUESTIONS 1-9: 15
SUM OF ALL RESPONSES TO PHQ QUESTIONS 1-9: 15
9. THOUGHTS THAT YOU WOULD BE BETTER OFF DEAD, OR OF HURTING YOURSELF: 2
SUM OF ALL RESPONSES TO PHQ QUESTIONS 1-9: 15
6. FEELING BAD ABOUT YOURSELF - OR THAT YOU ARE A FAILURE OR HAVE LET YOURSELF OR YOUR FAMILY DOWN: 2
SUM OF ALL RESPONSES TO PHQ9 QUESTIONS 1 & 2: 4
4. FEELING TIRED OR HAVING LITTLE ENERGY: 2
8. MOVING OR SPEAKING SO SLOWLY THAT OTHER PEOPLE COULD HAVE NOTICED. OR THE OPPOSITE, BEING SO FIGETY OR RESTLESS THAT YOU HAVE BEEN MOVING AROUND A LOT MORE THAN USUAL: 0
5. POOR APPETITE OR OVEREATING: 2
3. TROUBLE FALLING OR STAYING ASLEEP: 2
10. IF YOU CHECKED OFF ANY PROBLEMS, HOW DIFFICULT HAVE THESE PROBLEMS MADE IT FOR YOU TO DO YOUR WORK, TAKE CARE OF THINGS AT HOME, OR GET ALONG WITH OTHER PEOPLE: 2

## 2023-09-08 ASSESSMENT — SLEEP AND FATIGUE QUESTIONNAIRES
DO YOU USE A SLEEP AID: NO
AVERAGE NUMBER OF SLEEP HOURS: 7
DO YOU HAVE DIFFICULTY SLEEPING: NO

## 2023-09-08 ASSESSMENT — PAIN SCALES - GENERAL
PAINLEVEL_OUTOF10: 0
PAINLEVEL_OUTOF10: 7
PAINLEVEL_OUTOF10: 3

## 2023-09-08 ASSESSMENT — PAIN DESCRIPTION - DESCRIPTORS: DESCRIPTORS: ACHING

## 2023-09-08 ASSESSMENT — PAIN - FUNCTIONAL ASSESSMENT: PAIN_FUNCTIONAL_ASSESSMENT: NONE - DENIES PAIN

## 2023-09-08 NOTE — PROGRESS NOTES
Behavioral Services  Medicare Certification Upon Admission    I certify that this patient's inpatient psychiatric hospital admission is medically necessary for:    [x] (1) Treatment which could reasonably be expected to improve this patient's condition,       [x] (2) Or for diagnostic study;     AND     [x](2) The inpatient psychiatric services are provided while the individual is under the care of a physician and are included in the individualized plan of care.     Estimated length of stay/service 4 to 7 days    Plan for post-hospital care Home with outpatient community mental health follow-up    Electronically signed by Kerrie Parker MD on 9/8/2023 at 1:01 PM

## 2023-09-08 NOTE — PROGRESS NOTES
Pharmacy Medication History Note      List of current medications patient is taking is complete. [facility],Dr. Tone Roberto' Office    Changes made to medication list:  Medications removed (include reason, ex. therapy complete or physician discontinued, noncompliance): Invega sustenna (list clean up), Acetaminophen (list clean up)    Medications flagged for provider review:  none    Medications added/doses adjusted: Added Alprazolam 0.5 mg twice daily as needed  Adjusted Clonazepam to 2 mg three times daily  Added Aripiprazole 10 mg daily  Added Hydroxyzine 50 mg three times daily as needed  Added Prazosin 1 mg nightly  Added Trazodone 50 mg nightly as needed    Other notes (ex. Recent course of antibiotics, Coumadin dosing): The patient has not filled her psychiatric medications since July 2023 after being discharged from [facility] - seen by Dr. Tone Roberto during admission but did not follow up outpatient. She has been filling her Clonazepam and Alprazolam regularly which are prescribed by her neurologist (Dr. Gee Nelson). Current Home Medication List at Time of Admission:  Prior to Admission medications    Medication Sig   ALPRAZolam (XANAX) 0.5 MG tablet Take 1 tablet by mouth 2 times daily as needed   ARIPiprazole (ABILIFY) 10 MG tablet Take 1 tablet by mouth daily   hydrOXYzine HCl (ATARAX) 50 MG tablet Take 1 tablet by mouth 3 times daily as needed for Itching   prazosin (MINIPRESS) 1 MG capsule Take 1 capsule by mouth nightly   traZODone (DESYREL) 50 MG tablet Take 1 tablet by mouth nightly as needed for Sleep   clonazePAM (KLONOPIN) 2 MG tablet Take 1 tablet by mouth 3 times daily         Please let me know if you have any questions about this encounter. Thank you!     Electronically signed by JOY Ljoa [facility] on 9/8/2023 at 10:24 AM
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
## 2023-09-08 NOTE — GROUP NOTE
Group Therapy Note    Date: 9/8/2023    Group Start Time: 0930  Group End Time: 1000  Group Topic: Community Meeting    [address], RN        Group Therapy Note    Attendees: 7    Patient attended and participated in morning Community Meeting and Goal Setting Group       Patient's Goal:  \"start invega sustenna to get my voices to go away. \"    Signature:  Dell Funez RN

## 2023-09-08 NOTE — PROGRESS NOTES
[facility]  Admission Note     Admission Type:   Admission Type: Involuntary    Reason for admission:  Reason for Admission: living @ half way house became agitated, experiencing auditory hallucinations & threatened to hurt self. agitated in ED requiring emergency med's      Addictive Behavior:   Addictive Behavior  In the Past 3 Months, Have You Felt or Has Someone Told You That You Have a Problem With  : None    Medical Problems:   Past Medical History:   Diagnosis Date    Anxiety     Bipolar 1 disorder ([facility])     Concussion with brief loss of consciousness 02/19/2017    S/P Fall_Down 13 steps_Tripping over [facility]_    Depression     GERD (gastroesophageal reflux disease)     Hepatitis C     IBS (irritable bowel syndrome)     IVDU (intravenous drug user)     History of IV drug use     Neck pain 02/19/2017    S/P Fall_Down 13 steps_Tripping over [facility]_    Paresthesia and pain of right extremity 02/19/2017    S/P Fall_Down 13 steps_Tripping over [facility]_    Pelvic pain     Prolactin increased     PTSD (post-traumatic stress disorder)     Renal calculi 02/21/2022    Schizophrenia ([address] [facility])     Suicidal ideation        Status EXAM:  Mental Status and Behavioral Exam  Normal: No  Level of Assistance: Independent/Self  Facial Expression: Worried  Affect: Unstable  Level of Consciousness: Alert  Frequency of Checks: 4 times per hour, close  Mood:Normal: No  Mood: Depressed, Anxious  Motor Activity:Normal: Yes  Eye Contact: Good  Observed Behavior: Cooperative, Friendly  Sexual Misconduct History: Current - no  Preception: Others (comment) (oriented x4)  Attention:Normal: Yes  Thought Processes: Unremarkable  Thought Content:Normal: Yes  Depression Symptoms: Increased irritability  Anxiety Symptoms: Generalized  Shweta Symptoms: Poor judgment, Labile  Hallucinations:  Auditory (comment)  Delusions: No  Memory:Normal: Yes  Insight and Judgment: Ravi. Experiencing auditory hallucinations causing increased depression with suicidal thoughts which she denied on admit. Agitated in ED requiring emergency med's. Pleasant/cooperative here with admit process & ate meal afterwards & went to bed. Wanded with no contraband being found.     Lashawn Lindsay, RN
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
## 2023-09-08 NOTE — H&P
[facility] Internal Medicine  Sujata Quiñonez MD; Estrella Díaz MD; Curtis Ibarra MD; MD Alfredo Martinez MD; MD ABDULLAHI [facility]   [address]    HISTORY AND PHYSICAL EXAMINATION            Date:   9/8/2023  Patient name:  Goldie Stevens  Date of admission:  9/8/2023 12:39 AM  MRN:   [id number]  Account:  [de-identified]  YOB: 1990  PCP:    No primary care provider on file. Room:   [address] [place] [address]  Code Status:    Full Code    Chief Complaint:     No chief complaint on file. Hep c      History Obtained From:     Patient medical record    History of Present Illness:     Goldie Stevens is a 28 y.o. Non- / non  female who presents with No chief complaint on file. and is admitted to the hospital for the management of Schizoaffective disorder, bipolar type ([facility]).   28 lady class II obese BMI is 36.5 with a history of street drug abuse history of hepatitis C untreated history of irritable bowel syndrome denies any nausea vomiting diarrhea denies any icterus no right upper quadrant pain  Recent liver function test bilirubin elevated at 1.3 AST ALT ammonia levels within normal range    Past Medical History:     Past Medical History:   Diagnosis Date    Anxiety     Bipolar 1 disorder ([address])     Concussion with brief loss of consciousness 02/19/2017    S/P Fall_Down 13 steps_Tripping over [facility]_    Depression     GERD (gastroesophageal reflux disease)     Hepatitis C     IBS (irritable bowel syndrome)     IVDU (intravenous drug user)     History of IV drug use     Neck pain 02/19/2017    S/P Fall_Down 13 steps_Tripping over [facility]_    Paresthesia and pain of right extremity 02/19/2017    S/P Fall_Down 13 steps_Tripping over [facility]_    Pelvic pain     Prolactin increased     PTSD (post-traumatic stress disorder)

## 2023-09-08 NOTE — GROUP NOTE
Group Therapy Note    Date: 9/8/2023    Group Start Time: 1000  Group End Time: 4025  Group Topic: Psychoeducation    [facility] SANG Thibodeaux        Group Therapy Note    Attendees: 4/12     Patient was offered group therapy today but declined to participate despite encouragement from staff. 1:1 was offered. Signature:   Amaya Thibodeaux

## 2023-09-08 NOTE — GROUP NOTE
Group Therapy Note    Date: 9/8/2023    Group Start Time: 1100  Group End Time: 1130  Group Topic: Cognitive Skills    NAVEEN Woods    Cognitive Skills Group Note        Date: September 8, 2023 Start Time: 11am  End Time: 11:30am      Number of Participants in Group & Unit Census:  6/12    Topic: cognitive skills     Goal of Group: pt will demonstrate improved concentration and improved copoing skills       Comments:     Patient did not participate in Cognitive Skills group, despite staff encouragement and explanation of benefits. Patient remain seclusive to self. Q15 minute safety checks maintained for patient safety and will continue to encourage patient to attend unit programming.               Signature:  aMry Clark

## 2023-09-08 NOTE — PLAN OF CARE
Problem: Shweta  Goal: Will exhibit normal sleep and speech and no impulsivity  Description: INTERVENTIONS:  1. Administer medication as ordered  2. Set limits on impulsive behavior  3. Make attempts to decrease external stimuli as possible  9/8/2023 1305 by Martha Anderson RN  Outcome: Progressing  Note: Patient has been resting appropriately throughout the day. Patient is able to wake when talked to.   1:1 with pt x ten minutes. Pt encouraged to attend unit programming and interact with peers and staff. Pt also encouraged to tend to hygiene and ADLs. Pt encouraged to discuss feelings with staff and feedback and reassurance provided. Problem: Depression/Self Harm  Goal: Effect of psychiatric condition will be minimized and patient will be protected from self harm  Description: INTERVENTIONS:  1. Assess impact of patient's symptoms on level of functioning, self care needs and offer support as indicated  2. Assess patient/family knowledge of depression, impact on illness and need for teaching  3. Provide emotional support, presence and reassurance  4. Assess for possible suicidal thoughts or ideation. If patient expresses suicidal thoughts or statements do not leave alone, initiate Suicide Precautions, move to a room close to the nursing station and obtain sitter  5. Initiate consults as appropriate with Mental Health Professional, Spiritual Care, Psychosocial CNS, and consider a recommendation to the LIP for a Psychiatric Consultation  Outcome: Progressing  Note: Pt denied thoughts of SI/HI/self-harm and agreed to seek out staff should thoughts of SI/HI/self-harm arise. Safe environment maintained. Q15 minute checks for safety continued per unit policy. Will continue to monitor for safety and provide support and reassurance as needed.          Problem: Pain  Goal: Verbalizes/displays adequate comfort level or baseline comfort level  Outcome: Progressing

## 2023-09-08 NOTE — PLAN OF CARE
[facility]  Initial Interdisciplinary Treatment Plan NO      Original treatment plan Date & Time: 9/8/23 0900    Admission Type:  Admission Type:  Involuntary    Reason for admission:   Reason for Admission: living @ half way house became agitated, experiencing auditory hallucinations & threatened to hurt self. agitated in ED requiring emergency med's    Estimated Length of Stay:  5-7days  Estimated Discharge Date: to be determined by physician    PATIENT STRENGTHS:  Patient Strengths:   Patient Strengths and Limitations:   Addictive Behavior: Addictive Behavior  In the Past 3 Months, Have You Felt or Has Someone Told You That You Have a Problem With  : None  Medical Problems:  Past Medical History:   Diagnosis Date    Anxiety     Bipolar 1 disorder ([address])     Concussion with brief loss of consciousness 02/19/2017    S/P Fall_Down 13 steps_Tripping over [facility]_    Depression     GERD (gastroesophageal reflux disease)     Hepatitis C     IBS (irritable bowel syndrome)     IVDU (intravenous drug user)     History of IV drug use     Neck pain 02/19/2017    S/P Fall_Down 13 steps_Tripping over [facility]_    Paresthesia and pain of right extremity 02/19/2017    S/P Fall_Down 13 steps_Tripping over [facility]_    Pelvic pain     Prolactin increased     PTSD (post-traumatic stress disorder)     Renal calculi 02/21/2022    Schizophrenia ([address])     Suicidal ideation      Status EXAM:Mental Status and Behavioral Exam  Normal: No  Level of Assistance: Independent/Self  Facial Expression: Worried  Affect: Unstable  Level of Consciousness: Alert  Frequency of Checks: 4 times per hour, close  Mood:Normal: No  Mood: Depressed, Anxious  Motor Activity:Normal: Yes  Eye Contact: Good  Observed Behavior: Cooperative, Friendly  Sexual Misconduct History: Current - no  Preception: Others (comment) (oriented x4)  Attention:Normal: Yes  Thought Processes:

## 2023-09-08 NOTE — CARE COORDINATION
Social work provided support to pt as pt attempted to contact  regarding returning to [facility]. Pt left message with unit phone number and her passcode.

## 2023-09-09 LAB
BILIRUB UR QL STRIP: NEGATIVE
CHOLEST SERPL-MCNC: 126 MG/DL
CHOLESTEROL/HDL RATIO: 3.2
CLARITY UR: CLEAR
COLOR UR: YELLOW
COMMENT: NORMAL
GLUCOSE UR STRIP-MCNC: NEGATIVE MG/DL
HDLC SERPL-MCNC: 39 MG/DL
HGB UR QL STRIP.AUTO: NEGATIVE
KETONES UR STRIP-MCNC: NEGATIVE MG/DL
LDLC SERPL CALC-MCNC: 77 MG/DL (ref 0–130)
LEUKOCYTE ESTERASE UR QL STRIP: NEGATIVE
NITRITE UR QL STRIP: NEGATIVE
PH UR STRIP: 6 [PH] (ref 5–8)
PROT UR STRIP-MCNC: NEGATIVE MG/DL
SP GR UR STRIP: 1.01 (ref 1–1.03)
TRIGL SERPL-MCNC: 49 MG/DL
UROBILINOGEN UR STRIP-ACNC: NORMAL EU/DL (ref 0–1)

## 2023-09-09 PROCEDURE — 81003 URINALYSIS AUTO W/O SCOPE: CPT

## 2023-09-09 PROCEDURE — 6360000002 HC RX W HCPCS: Performed by: PSYCHIATRY & NEUROLOGY

## 2023-09-09 PROCEDURE — 6370000000 HC RX 637 (ALT 250 FOR IP): Performed by: PSYCHIATRY & NEUROLOGY

## 2023-09-09 PROCEDURE — 99232 SBSQ HOSP IP/OBS MODERATE 35: CPT | Performed by: PSYCHIATRY & NEUROLOGY

## 2023-09-09 PROCEDURE — APPSS30 APP SPLIT SHARED TIME 16-30 MINUTES: Performed by: PSYCHIATRY & NEUROLOGY

## 2023-09-09 PROCEDURE — 1240000000 HC EMOTIONAL WELLNESS R&B

## 2023-09-09 PROCEDURE — 36415 COLL VENOUS BLD VENIPUNCTURE: CPT

## 2023-09-09 PROCEDURE — 83036 HEMOGLOBIN GLYCOSYLATED A1C: CPT

## 2023-09-09 PROCEDURE — 99232 SBSQ HOSP IP/OBS MODERATE 35: CPT | Performed by: INTERNAL MEDICINE

## 2023-09-09 PROCEDURE — 80061 LIPID PANEL: CPT

## 2023-09-09 RX ORDER — PALIPERIDONE 6 MG/1
6 TABLET, EXTENDED RELEASE ORAL DAILY
Status: DISCONTINUED | OUTPATIENT
Start: 2023-09-10 | End: 2023-09-11

## 2023-09-09 RX ORDER — HALOPERIDOL 5 MG/ML
5 INJECTION INTRAMUSCULAR EVERY 6 HOURS PRN
Status: DISCONTINUED | OUTPATIENT
Start: 2023-09-09 | End: 2023-09-14 | Stop reason: HOSPADM

## 2023-09-09 RX ORDER — DIPHENHYDRAMINE HYDROCHLORIDE 50 MG/ML
50 INJECTION INTRAMUSCULAR; INTRAVENOUS EVERY 6 HOURS PRN
Status: DISCONTINUED | OUTPATIENT
Start: 2023-09-09 | End: 2023-09-14 | Stop reason: HOSPADM

## 2023-09-09 RX ORDER — PALIPERIDONE 3 MG/1
3 TABLET, EXTENDED RELEASE ORAL ONCE
Status: COMPLETED | OUTPATIENT
Start: 2023-09-09 | End: 2023-09-09

## 2023-09-09 RX ORDER — HALOPERIDOL 5 MG/1
5 TABLET ORAL EVERY 6 HOURS PRN
Status: DISCONTINUED | OUTPATIENT
Start: 2023-09-09 | End: 2023-09-14 | Stop reason: HOSPADM

## 2023-09-09 RX ADMIN — DIPHENHYDRAMINE HYDROCHLORIDE 50 MG: 50 INJECTION INTRAMUSCULAR; INTRAVENOUS at 14:41

## 2023-09-09 RX ADMIN — PALIPERIDONE 3 MG: 3 TABLET, EXTENDED RELEASE ORAL at 11:31

## 2023-09-09 RX ADMIN — ACETAMINOPHEN 650 MG: 325 TABLET ORAL at 13:11

## 2023-09-09 RX ADMIN — HYDROXYZINE HYDROCHLORIDE 50 MG: 50 TABLET, FILM COATED ORAL at 08:28

## 2023-09-09 RX ADMIN — HALOPERIDOL LACTATE 5 MG: 5 INJECTION, SOLUTION INTRAMUSCULAR at 14:42

## 2023-09-09 RX ADMIN — HYDROXYZINE HYDROCHLORIDE 50 MG: 50 TABLET, FILM COATED ORAL at 12:41

## 2023-09-09 RX ADMIN — HYDROXYZINE HYDROCHLORIDE 50 MG: 50 TABLET, FILM COATED ORAL at 20:42

## 2023-09-09 RX ADMIN — TRAZODONE HYDROCHLORIDE 50 MG: 50 TABLET ORAL at 20:42

## 2023-09-09 RX ADMIN — PALIPERIDONE 3 MG: 3 TABLET, EXTENDED RELEASE ORAL at 08:28

## 2023-09-09 RX ADMIN — CLONIDINE HYDROCHLORIDE 0.1 MG: 0.1 TABLET ORAL at 20:42

## 2023-09-09 ASSESSMENT — PAIN DESCRIPTION - ORIENTATION: ORIENTATION: LOWER

## 2023-09-09 ASSESSMENT — PAIN DESCRIPTION - LOCATION: LOCATION: BACK

## 2023-09-09 ASSESSMENT — PAIN SCALES - GENERAL
PAINLEVEL_OUTOF10: 3
PAINLEVEL_OUTOF10: 0

## 2023-09-09 NOTE — PLAN OF CARE
Problem: Shweta  Goal: Will exhibit normal sleep and speech and no impulsivity  Description: INTERVENTIONS:  1. Administer medication as ordered  2. Set limits on impulsive behavior  3. Make attempts to decrease external stimuli as possible  Outcome: Progressing   Patient is calm, controlled and medication compliant. Patient denies suicidal ideations but reports depression and anxiety; patient reports voices to harm herself. Patient is polite with staff and reports eating and sleeping adequately with safety checks Q15min and at irregular intervals. Problem: Depression/Self Harm  Goal: Effect of psychiatric condition will be minimized and patient will be protected from self harm  Description: INTERVENTIONS:  1. Assess impact of patient's symptoms on level of functioning, self care needs and offer support as indicated  2. Assess patient/family knowledge of depression, impact on illness and need for teaching  3. Provide emotional support, presence and reassurance  4. Assess for possible suicidal thoughts or ideation. If patient expresses suicidal thoughts or statements do not leave alone, initiate Suicide Precautions, move to a room close to the nursing station and obtain sitter  5. Initiate consults as appropriate with Mental Health Professional, Spiritual Care, Psychosocial CNS, and consider a recommendation to the LIP for a Psychiatric Consultation  Outcome: Progressing   Patient is calm, controlled and medication compliant. Patient denies suicidal ideations but reports depression and anxiety; patient reports voices to harm herself. Patient is polite with staff and reports eating and sleeping adequately with safety checks Q15min and at irregular intervals.    Problem: Pain  Goal: Verbalizes/displays adequate comfort level or baseline comfort level  Outcome: Progressing   Pain is managed with PRN medications as needed  Problem: Risk for Elopement  Goal: Patient will not exit the unit/facility without proper

## 2023-09-09 NOTE — PLAN OF CARE
Problem: Shweta  Goal: Will exhibit normal sleep and speech and no impulsivity  Description: INTERVENTIONS:  1. Administer medication as ordered  2. Set limits on impulsive behavior  3. Make attempts to decrease external stimuli as possible  9/8/2023 2124 by Greta Markham RN  Outcome: Progressing   Has been resting most of shift. Pleasant when woke up & med compliant. Problem: Depression/Self Harm  Goal: Effect of psychiatric condition will be minimized and patient will be protected from self harm  Description: INTERVENTIONS:  1. Assess impact of patient's symptoms on level of functioning, self care needs and offer support as indicated  2. Assess patient/family knowledge of depression, impact on illness and need for teaching  3. Provide emotional support, presence and reassurance  4. Assess for possible suicidal thoughts or ideation. If patient expresses suicidal thoughts or statements do not leave alone, initiate Suicide Precautions, move to a room close to the nursing station and obtain sitter  5. Initiate consults as appropriate with Mental Health Professional, Spiritual Care, Psychosocial CNS, and consider a recommendation to the LIP for a Psychiatric Consultation  9/8/2023 2124 by Greta Markham RN  Outcome: Progressing   Denies wanting to harm self & behaviors reflect same. Problem: Pain  Goal: Verbalizes/displays adequate comfort level or baseline comfort level  9/8/2023 2124 by Greta Markham RN  Outcome: Progressing   Denies current pain. Problem: Risk for Elopement  Goal: Patient will not exit the unit/facility without proper excort  9/8/2023 2124 by Greta Markham RN  Outcome: Progressing   Does not talk about or attempt to leave unit.

## 2023-09-09 NOTE — BH NOTE
Emergency Medication Follow-Up Note:    PRN medication of Atarax 50mg PO PRN was effective as evidence by resting calmly with no distress. Patient denies medication side effects. Will continue to monitor and provide support as needed.

## 2023-09-09 NOTE — PROGRESS NOTES
Daily Progress Note  9/9/2023    Patient Name: Goldie Stevens    CHIEF COMPLAINT:  Depression with suicidal ideation. SUBJECTIVE:      Patient is seen today for a follow up assessment. Nursing staff report the patient has maintained medication adherence. She is agreeable to assessment the privacy of the exam room where she reports her anxiety as \"off the charts \". She explains she has been utilizing benzodiazepines however during her time of incarceration she did not have access to these medications sharing that her last dose was August 23. We discussed the risks of utilizing the specific for anxiety and she verbalizes an understanding. Patient shares that she has been utilizing coping mechanisms including walking and one-to-one time with her assigned nurse however she is reporting intense desire to self-harm. She denies having access to anything dangerous and is aware that additional medications as needed have been ordered. She continues to endorse suicidal ideation and is not able to contract for safety if she were to return to the [facility] where she was residing.     Appetite:  [] Adequate/Unchanged  [] Increased  [x] Decreased      Sleep:       [] Adequate/Unchanged  [x] Fair  [] Poor      Group Attendance on Unit:   [x] Yes   [] Selectively    [] No    Compliant with scheduled medications: [x] Yes  [] No    Received emergency medications in past 24 hrs: [] Yes   [x] No    Medication Side Effects: Denies         Mental Status Exam  Level of consciousness: Alert and awake   Appearance: Appropriate attire for setting, seated in chair, with fair  grooming and hygiene   Behavior/Motor: Approachable, polite, engages with interviewer, no psychomotor abnormalities   Attitude toward examiner: Cooperative, attentive, good eye contact  Speech: normal rate, normal volume, and well articulated   Mood: Anxious  Affect: Congruent  Thought processes: linear and perservative   Thought content: inconsistent with clinical signs   and symptoms or necessary for patient management,  should be tested with an alternative molecular assay. Negative results do not preclude   SARS-CoV-2 infection and   should not be used as the sole basis for patient management decisions. Fact sheet for Healthcare Providers: [email or web address].[email or web address]  Fact sheet for Patients: [email or web address].[email or web address]        Methodology: Isothermal Nucleic Acid Amplification           Reviewed patient's current plan of care and vital signs with nursing staff. Labs reviewed: [x] Yes  EKG reviewed with QTc 438  Medications  Current Facility-Administered Medications: haloperidol lactate (HALDOL) injection 5 mg, 5 mg, IntraMUSCular, Q6H PRN **AND** diphenhydrAMINE (BENADRYL) injection 50 mg, 50 mg, IntraMUSCular, Q6H PRN  haloperidol (HALDOL) tablet 5 mg, 5 mg, Oral, Q6H PRN  acetaminophen (TYLENOL) tablet 650 mg, 650 mg, Oral, Q4H PRN  aluminum & magnesium hydroxide-simethicone (MAALOX) 200-200-20 MG/5ML suspension 30 mL, 30 mL, Oral, Q6H PRN  hydrOXYzine HCl (ATARAX) tablet 50 mg, 50 mg, Oral, TID PRN  nicotine (NICODERM CQ) 14 MG/24HR 1 patch, 1 patch, TransDERmal, Daily  polyethylene glycol (GLYCOLAX) packet 17 g, 17 g, Oral, Daily PRN  traZODone (DESYREL) tablet 50 mg, 50 mg, Oral, Nightly PRN  paliperidone (INVEGA) extended release tablet 3 mg, 3 mg, Oral, Daily  cloNIDine (CATAPRES) tablet 0.1 mg, 0.1 mg, Oral, Nightly    ASSESSMENT  Schizoaffective disorder, bipolar type (HCC)         PATIENT HANDOFF  Patient symptoms remain unstable  Patient reports her last benzodiazepine was August 23, she has completed withdrawal symptoms associated with utilizing this class of medications  Monitor need and frequency of PRN medications. Encourage participation in groups and milieu. Medication changes and discharge planning per attending  Follow-up daily while inpatient.      Patient continues to be monitored in the

## 2023-09-09 NOTE — BH NOTE
RN called Radiology regarding ordered Renal Ultrasound. Radiology is only on call for the weekend for the ER and will plan to complete procedure on Monday.

## 2023-09-09 NOTE — BH NOTE
Haldol 5mg IM/Benadryl 50mg IM given for increased agitation d/t increase in voices. Patient was offered 1:1 talk time, quiet time and diversions. Patient is accepting of medication.

## 2023-09-09 NOTE — BH NOTE
Emergency Medication Follow-Up Note:    PRN medication of Haldol 5mg/Benadryl 50mg IM was effective as evidence by sitting quietly in dayroom. Patient denies medication side effects. Will continue to monitor and provide support as needed.

## 2023-09-09 NOTE — GROUP NOTE
Group Therapy Note    Date: 9/9/2023    Group Start Time: 1025  Group End Time: 0558  Group Topic: Cognitive Skills    TEDDY KIM    Martin eBnoit Ala    Cognitive Skills Group Note        Date: September 9, 2023 Start Time:  10:25 am   End Time:  11:15 am       Number of Participants in Group & Unit Census:  6/12    Topic: Cognitive skills, social skills, communication    Goal of Group: To improve cognitive thinking through decision making, turn taking and concentration/focus to task. To improve ability to communicate appropriately. To increase interpersonal interactions with peers. Comments:     Patient did not participate in Cognitive Skills group, despite staff encouragement and explanation of benefits. Patient remain seclusive to self. Q15 minute safety checks maintained for patient safety and will continue to encourage patient to attend unit programming.         Signature:  Martin Benoit Ala

## 2023-09-09 NOTE — PROGRESS NOTES
[facility]  Kannan Hicks MD; Henna Burkett MD; Julieth Ayoub MD; MD Sangeeta Campa MD; Helena Barnhart MD    [facility] Internal Medicine   [address] [place] [address]    HISTORY AND PHYSICAL EXAMINATION            Date:   9/9/2023  Patient name:  Dennise Wu  Date of admission:  9/8/2023 12:39 AM  MRN:   [id number]  Account:  [de-identified]  YOB: 1990  PCP:    No primary care provider on file. Room:   [address] [place] [address]  Code Status:    Full Code    Chief Complaint:     No chief complaint on file. Hep c      History Obtained From:     Patient medical record    History of Present Illness:     Dennise Wu is a 28 y.o. Non- / non  female who presents with No chief complaint on file. and is admitted to the hospital for the management of Schizoaffective disorder, bipolar type ([address]   28 lady class II obese BMI is 36.5 with a history of street drug abuse history of hepatitis C untreated history of irritable bowel syndrome denies any nausea vomiting diarrhea denies any icterus no right upper quadrant pain  Recent liver function test bilirubin elevated at 1.3 AST ALT ammonia levels within normal range    Past Medical History:     Past Medical History:   Diagnosis Date    Anxiety     Bipolar 1 disorder ([address])     Concussion with brief loss of consciousness 02/19/2017    S/P Fall_Down 13 steps_Tripping over [facility]_    Depression     GERD (gastroesophageal reflux disease)     Hepatitis C     IBS (irritable bowel syndrome)     IVDU (intravenous drug user)     History of IV drug use     Neck pain 02/19/2017    S/P Fall_Down 13 steps_Tripping over [facility]_    Paresthesia and pain of right extremity 02/19/2017    S/P Fall_Down 13 steps_Tripping over [facility]_    Pelvic pain     Prolactin increased     PTSD (post-traumatic stress disorder)

## 2023-09-09 NOTE — BH NOTE
Emergency Medication Follow-Up Note:    PRN medication of Atarax 50mg PO PRN was ineffective as patient is currently exhibiting Agitated  behavior as evidence by increased anxiety. Staff has attempted to find and relieve the distress by Talking to patient and Offering suggestions , with no success. Staff will contact provider.

## 2023-09-09 NOTE — BH NOTE
Atarax 50mg PO PRN given for increased anxiety due to voices. Patient was offered 1:1 talk time, quiet time and diversions. Patient is accepting of medication.

## 2023-09-10 LAB
EST. AVERAGE GLUCOSE BLD GHB EST-MCNC: 103 MG/DL
HBA1C MFR BLD: 5.2 % (ref 4–6)

## 2023-09-10 PROCEDURE — 6370000000 HC RX 637 (ALT 250 FOR IP): Performed by: PSYCHIATRY & NEUROLOGY

## 2023-09-10 PROCEDURE — 1240000000 HC EMOTIONAL WELLNESS R&B

## 2023-09-10 PROCEDURE — 99232 SBSQ HOSP IP/OBS MODERATE 35: CPT | Performed by: PSYCHIATRY & NEUROLOGY

## 2023-09-10 PROCEDURE — APPSS30 APP SPLIT SHARED TIME 16-30 MINUTES

## 2023-09-10 PROCEDURE — 99232 SBSQ HOSP IP/OBS MODERATE 35: CPT | Performed by: INTERNAL MEDICINE

## 2023-09-10 RX ORDER — CLONIDINE HYDROCHLORIDE 0.1 MG/1
0.1 TABLET ORAL 2 TIMES DAILY
Status: DISCONTINUED | OUTPATIENT
Start: 2023-09-10 | End: 2023-09-13

## 2023-09-10 RX ADMIN — HYDROXYZINE HYDROCHLORIDE 50 MG: 50 TABLET, FILM COATED ORAL at 15:42

## 2023-09-10 RX ADMIN — HALOPERIDOL 5 MG: 5 TABLET ORAL at 09:59

## 2023-09-10 RX ADMIN — HYDROXYZINE HYDROCHLORIDE 50 MG: 50 TABLET, FILM COATED ORAL at 09:05

## 2023-09-10 RX ADMIN — PALIPERIDONE 6 MG: 6 TABLET, EXTENDED RELEASE ORAL at 09:07

## 2023-09-10 RX ADMIN — TRAZODONE HYDROCHLORIDE 50 MG: 50 TABLET ORAL at 21:03

## 2023-09-10 RX ADMIN — CLONIDINE HYDROCHLORIDE 0.1 MG: 0.1 TABLET ORAL at 21:03

## 2023-09-10 NOTE — GROUP NOTE
Group Therapy Note    Date: 9/10/2023    Group Start Time: 1330  Group End Time: 4333  Group Topic: Psychoeducation    TEDDY KIM    HERBIE Fernández, MERYW        Group Therapy Note    Attendees: 7/11       Patient's Goal:  Expression of feeling    Notes:      Status After Intervention:  Improved    Participation Level: Interactive    Participation Quality: Sharing      Speech:  normal      Thought Process/Content: Logical      Affective Functioning: Congruent      Mood: euthymic      Level of consciousness:  Alert and Oriented x4      Response to Learning: Able to verbalize current knowledge/experience      Endings: None Reported    Modes of Intervention: Support      Discipline Responsible: /Counselor      Signature:  HERBIE Fernández, FLORENTINO

## 2023-09-10 NOTE — BH NOTE
PRN Haldol 5mg oral for agitation administration note    In a distressed look on face, patient states, \" My voices are  are still agitating me. The scheduled Invega and Atarax did not help  me. \"  Ida Corea has been observing patient sitting out in the day withdrawn and not engaging with peers. Patient appears tense as patient is observed frequently shifting positions, rocking back and forth and holding both hands on head. Writer  has offered patient suggestions, provided distractions, and assessed patient for pain/discomfort to which these interventions did not provide relief. PT had asked writer for an injection to relieve  the voices. Writer informs patient that Haldol is available in both oral and injection. Writer instructs patient that oral Haldol should be given before injectable form. Patient takes medication of Haldol 5 mg oral without difficulty. Will continue to monitor and provide support.

## 2023-09-10 NOTE — PROGRESS NOTES
Daily Progress Note  9/10/2023    Patient Name: Raven Schroeder    CHIEF COMPLAINT:  Depression with suicidal ideation. SUBJECTIVE:      Patient is seen today for a follow up assessment at bedside where she is seen laying in bed, free of distress or discomfort. She was asked if she would like to have spoken in privacy due to roommate at bedside however refused. Patient has been compliant with scheduled medication. She received oral Haldol due to auditory hallucinations causing her distress and agitation. Nursing staff reports the patient showered and participated in groups. On approach patient voices having intermittent suicidal ideation, denies any active plan or intent however unable to contract for safety of the community. She denies any homicidal ideation. She voices having auditory hallucinations telling her to harm self however states to be less intense since having received oral Haldol. Patient denies any visual hallucinations. She reports clonidine and Invega with no side effects. Patient is hopeful that titration of Invega will help improve his symptoms. We discussed possibility of titrating medication near future if no improvement in symptoms, patient agreeable. At this time patient not a candidate for lower level care due to instability symptoms and inability to contract for safety on the community.     Appetite:  [] Adequate/Unchanged  [x] Increased  [] Decreased      Sleep:       [x] Adequate/Unchanged  [] Fair  [] Poor      Group Attendance on Unit:   [x] Yes   [] Selectively    [] No    Compliant with scheduled medications: [x] Yes  [] No    Received emergency medications in past 24 hrs: [x] Yes   [] No    Medication Side Effects: Denies         Mental Status Exam  Level of consciousness: Alert and awake   Appearance: Appropriate attire for setting, resting in bed, with fair  grooming and hygiene   Behavior/Motor: Approachable, polite, engages with interviewer, no psychomotor

## 2023-09-10 NOTE — PLAN OF CARE
Problem: Shweta  Goal: Will exhibit normal sleep and speech and no impulsivity  Description: INTERVENTIONS:  1. Administer medication as ordered  2. Set limits on impulsive behavior  3. Make attempts to decrease external stimuli as possible  9/9/2023 2126 by Uyen Frank RN  Outcome: Progressing   Has been sleeping well & no impulsivity. this evening. Problem: Depression/Self Harm  Goal: Effect of psychiatric condition will be minimized and patient will be protected from self harm  Description: INTERVENTIONS:  1. Assess impact of patient's symptoms on level of functioning, self care needs and offer support as indicated  2. Assess patient/family knowledge of depression, impact on illness and need for teaching  3. Provide emotional support, presence and reassurance  4. Assess for possible suicidal thoughts or ideation. If patient expresses suicidal thoughts or statements do not leave alone, initiate Suicide Precautions, move to a room close to the nursing station and obtain sitter  5. Initiate consults as appropriate with Mental Health Professional, Spiritual Care, Psychosocial CNS, and consider a recommendation to the LIP for a Psychiatric Consultation  9/9/2023 2126 by Uyen Frank RN  Outcome: Progressing   Denies wanting to harm self & behaviors reflect same. Problem: Pain  Goal: Verbalizes/displays adequate comfort level or baseline comfort level  9/9/2023 2126 by Uyen Frank RN  Outcome: Progressing   Denies current pain. Problem: Risk for Elopement  Goal: Patient will not exit the unit/facility without proper excort  9/9/2023 2126 by Uyen Frank RN  Outcome: Progressing   Does not talk about or attempt to leave unit.

## 2023-09-10 NOTE — PLAN OF CARE
Pt denies having any thoughts of wanting to harm self or others, safety checks are being continued and maintained throughout writers shift. Patient reports depression/anxiety, and auditory hallucinations. Pt encouraged to explore coping skills for reducing anxiety. Problem: Shweta  Goal: Will exhibit normal sleep and speech and no impulsivity  Description: INTERVENTIONS:  1. Administer medication as ordered  2. Set limits on impulsive behavior  3. Make attempts to decrease external stimuli as possible  Outcome: Progressing     Problem: Depression/Self Harm  Goal: Effect of psychiatric condition will be minimized and patient will be protected from self harm  Description: INTERVENTIONS:  1. Assess impact of patient's symptoms on level of functioning, self care needs and offer support as indicated  2. Assess patient/family knowledge of depression, impact on illness and need for teaching  3. Provide emotional support, presence and reassurance  4. Assess for possible suicidal thoughts or ideation. If patient expresses suicidal thoughts or statements do not leave alone, initiate Suicide Precautions, move to a room close to the nursing station and obtain sitter  5.  Initiate consults as appropriate with Mental Health Professional, Spiritual Care, Psychosocial CNS, and consider a recommendation to the LIP for a Psychiatric Consultation  Outcome: Progressing     Problem: Pain  Goal: Verbalizes/displays adequate comfort level or baseline comfort level  Outcome: Progressing

## 2023-09-10 NOTE — BH NOTE
[address] [facility]  Day 3 Interdisciplinary Treatment Plan NOTE    Review Date & Time: 9/10/2023   3563    Admission Type:   Admission Type:  Involuntary    Reason for admission:  Reason for Admission: living @ half way house became agitated, experiencing auditory hallucinations & threatened to hurt self. agitated in ED requiring emergency med's  Estimated Length of Stay: 5-7 days  Estimated Discharge Date Update: to be determined by physician    PATIENT STRENGTHS:  Patient Strengths    Patient Strengths and Limitations:Limitations: Difficult relationships / poor social skills, Difficulty problem solving/relies on others to help solve problems, Inappropriate/potentially harmful leisure interests, Apathetic / unmotivated  Addictive Behavior:Addictive Behavior  In the Past 3 Months, Have You Felt or Has Someone Told You That You Have a Problem With  : None  Medical Problems:  Past Medical History:   Diagnosis Date    Anxiety     Bipolar 1 disorder ([address])     Concussion with brief loss of consciousness 02/19/2017    S/P Fall_Down 13 steps_Tripping over [facility]_    Depression     GERD (gastroesophageal reflux disease)     Hepatitis C     IBS (irritable bowel syndrome)     IVDU (intravenous drug user)     History of IV drug use     Neck pain 02/19/2017    S/P Fall_Down 13 steps_Tripping over [facility]_    Paresthesia and pain of right extremity 02/19/2017    S/P Fall_Down 13 steps_Tripping over [facility]_    Pelvic pain     Prolactin increased     PTSD (post-traumatic stress disorder)     Renal calculi 02/21/2022    Schizophrenia ([address])     Suicidal ideation        Risk:  Fall Risk   Ham Scale Ham Scale Score: 22  BVC    Change in scores no Changes to plan of Care no    Status EXAM:   Mental Status and Behavioral Exam  Normal: No  Level of Assistance: Independent/Self  Facial Expression: Flat, Worried  Affect: Appropriate  Level of Consciousness:

## 2023-09-10 NOTE — BH NOTE
Follow-up note : Haldol 5mg oral for agitation     Emergency Medication Follow-Up Note:    PRN medication of Haldol was effective  as evidence by  patient is participating  in group smiling, talking, and participating in a card game by Patient denies medication side effects. Will continue to monitor and provide support as needed.

## 2023-09-10 NOTE — GROUP NOTE
Group Therapy Note    Date: 9/10/2023    Group Start Time: 0900  Group End Time: 0438  Group Topic: Group Documentation    [facility] Linden Moe, RN        Group Therapy Note    Attendees: 6/11    Community Meeting Group Note        Date: September 10, 2023 Start Time: 9am  End Time:  9:25am      Number of Participants in Group & Unit Census:  6/11    Topic: Goals group    Goal of Group:to establish attainable daily goal(s)      Comments:     Patient did not participate in [employer] group, despite staff encouragement and explanation of benefits. Patient remain seclusive to self. Q15 minute safety checks maintained for patient safety and will continue to encourage patient to attend unit programming.

## 2023-09-10 NOTE — BH NOTE
PRN Atarax 50 mg for anxiety    During daily check-in, patient states, \"Can I have another shot for my voices? (patient did not elaborate what the voices were)  Writer and pt come to the agreement to start with daily scheduled medication and  as needed Atarax for anxiety and reassess in an hour. Patient takes scheduled Invega 6 mg oral and PRN Atarax 50 mg oral for anxiety without difficulty. Writer educates patient that PRN injections are for emergency purposes and that other interventions are implemented before emergency injections are administered. Writer instructs patient that other coping mechanisms need to be learned to to help patient adapt and function without distress. Writer teaches patient distraction  technique- deep breathing exercises, coloring, and journaling . Will continue to  monitor and provide support.

## 2023-09-10 NOTE — GROUP NOTE
Group Therapy Note    Date: 9/10/2023    Group Start Time: 1000  Group End Time: 1030  Group Topic: Cognitive Skills    NAVEEN Xie        Group Therapy Note    Attendees: 6/11       Patient's Goal:  pt will demonstrate improved concentration and improved decision making skills     Notes:   pt was pleasant and participated well     Status After Intervention:  Improved    Participation Level:  Active Listener and Interactive    Participation Quality: Appropriate, Sharing, and Supportive      Speech:  normal      Thought Process/Content: logical      Affective Functioning: Congruent      Mood: euthymic      Level of consciousness:  Alert      Response to Learning: Able to verbalize current knowledge/experience and Progressing to goal      Endings: None Reported    Modes of Intervention: Support, Socialization, Problem-solving, and Activity      Discipline Responsible: Psychoeducational Specialist      Signature:  Layman Real

## 2023-09-11 ENCOUNTER — APPOINTMENT (OUTPATIENT)
Dept: ULTRASOUND IMAGING | Age: 33
DRG: 750 | End: 2023-09-11
Attending: PSYCHIATRY & NEUROLOGY
Payer: COMMERCIAL

## 2023-09-11 PROCEDURE — 99232 SBSQ HOSP IP/OBS MODERATE 35: CPT | Performed by: INTERNAL MEDICINE

## 2023-09-11 PROCEDURE — APPSS30 APP SPLIT SHARED TIME 16-30 MINUTES

## 2023-09-11 PROCEDURE — 6370000000 HC RX 637 (ALT 250 FOR IP): Performed by: PSYCHIATRY & NEUROLOGY

## 2023-09-11 PROCEDURE — 1240000000 HC EMOTIONAL WELLNESS R&B

## 2023-09-11 PROCEDURE — 76770 US EXAM ABDO BACK WALL COMP: CPT

## 2023-09-11 PROCEDURE — 99232 SBSQ HOSP IP/OBS MODERATE 35: CPT | Performed by: PSYCHIATRY & NEUROLOGY

## 2023-09-11 RX ORDER — PALIPERIDONE 9 MG/1
9 TABLET, EXTENDED RELEASE ORAL DAILY
Status: DISCONTINUED | OUTPATIENT
Start: 2023-09-12 | End: 2023-09-14 | Stop reason: HOSPADM

## 2023-09-11 RX ORDER — PALIPERIDONE 3 MG/1
3 TABLET, EXTENDED RELEASE ORAL ONCE
Status: COMPLETED | OUTPATIENT
Start: 2023-09-11 | End: 2023-09-11

## 2023-09-11 RX ADMIN — ACETAMINOPHEN 650 MG: 325 TABLET ORAL at 18:02

## 2023-09-11 RX ADMIN — HYDROXYZINE HYDROCHLORIDE 50 MG: 50 TABLET, FILM COATED ORAL at 14:55

## 2023-09-11 RX ADMIN — HYDROXYZINE HYDROCHLORIDE 50 MG: 50 TABLET, FILM COATED ORAL at 00:13

## 2023-09-11 RX ADMIN — PALIPERIDONE 6 MG: 6 TABLET, EXTENDED RELEASE ORAL at 08:27

## 2023-09-11 RX ADMIN — CLONIDINE HYDROCHLORIDE 0.1 MG: 0.1 TABLET ORAL at 08:27

## 2023-09-11 RX ADMIN — TRAZODONE HYDROCHLORIDE 50 MG: 50 TABLET ORAL at 20:38

## 2023-09-11 RX ADMIN — HYDROXYZINE HYDROCHLORIDE 50 MG: 50 TABLET, FILM COATED ORAL at 08:27

## 2023-09-11 RX ADMIN — PALIPERIDONE 3 MG: 3 TABLET, EXTENDED RELEASE ORAL at 09:43

## 2023-09-11 RX ADMIN — CLONIDINE HYDROCHLORIDE 0.1 MG: 0.1 TABLET ORAL at 20:38

## 2023-09-11 ASSESSMENT — PAIN DESCRIPTION - LOCATION: LOCATION: ABDOMEN

## 2023-09-11 NOTE — PLAN OF CARE
Problem: Shweta  Goal: Will exhibit normal sleep and speech and no impulsivity  Description: INTERVENTIONS:  1. Administer medication as ordered  2. Set limits on impulsive behavior  3. Make attempts to decrease external stimuli as possible  9/10/2023 2025 by Juan Carlos Youssef RN  Outcome: Progressing   She has not been impulsive & has been sleeping well. Problem: Depression/Self Harm  Goal: Effect of psychiatric condition will be minimized and patient will be protected from self harm  Description: INTERVENTIONS:  1. Assess impact of patient's symptoms on level of functioning, self care needs and offer support as indicated  2. Assess patient/family knowledge of depression, impact on illness and need for teaching  3. Provide emotional support, presence and reassurance  4. Assess for possible suicidal thoughts or ideation. If patient expresses suicidal thoughts or statements do not leave alone, initiate Suicide Precautions, move to a room close to the nursing station and obtain sitter  5. Initiate consults as appropriate with Mental Health Professional, Spiritual Care, Psychosocial CNS, and consider a recommendation to the LIP for a Psychiatric Consultation  9/10/2023 2025 by Juan Carlos Youssef RN  Outcome: Progressing   Denies wanting to harm self & behaviors reflect same. Problem: Pain  Goal: Verbalizes/displays adequate comfort level or baseline comfort level  9/10/2023 2025 by Juan Carlos Youssef RN  Outcome: Progressing   Denies current pain.

## 2023-09-11 NOTE — BH NOTE
Patient left the unit via wheelchair, transported to Ultrasound by one I staff and one transport staff.

## 2023-09-11 NOTE — GROUP NOTE
Group Therapy Note    Date: 9/11/2023    Group Start Time: 1330  Group End Time: 3685  Group Topic: Activity    [facility] JEFFERY Kebede, ANJANAS        Group Therapy Note    Attendees: 5/9     Patient's Goal: Topic: To increase social interaction, self expression and concentration using art, and communication skills. Notes: Pt was polite and cooperative. Pt was able to practice self expression and concentration using art, and communication skills . Status After Intervention: Improved         Participation Level: Active Listener ,engaged in task     Participation Quality:  Attentive ,  engaged in task     Speech:  Normal tone but guarded     Thought Process/Content: Logical , linear r/t task and group topics questions     Affective Functioning: Blunted, brightened     Mood: Euthymic on approach but guarded when peers were sharing     Level of consciousness:  Alert, attentive     Response to Learning: Able to demonstrate current knowledge/experience , Able to acknowledge/verbalize new learning, and Progressing to goal        Endings: None Reported     Modes of Intervention: Education, Support, Socialization, Exploration, Clarifying and   Problem-solving.         Discipline Responsible: Psychoeducational Specialist      Signature:  Yamilet Mcwilliams

## 2023-09-11 NOTE — PROGRESS NOTES
Daily Progress Note  9/11/2023    Patient Name: Malissa Milner    CHIEF COMPLAINT:  Depression with suicidal ideation. SUBJECTIVE:      Patient is seen today for a follow up assessment at bedside where she is seen laying in bed, free of distress or discomfort. Approach patient is calm, pleasant and willing to engage in coherent conversation. She reports to be tolerating clonidine well with no side effects. She denies any dizziness or lightheadedness. Patient open to transition to patch. Patient states clarity of thought and improvement in auditory hallucinations since [place]. Plan to titrate to 9 mg per attending. Patient is accepting to receiving a long-acting injectable on this hospitalization. She states suicidal ideation to be causing her mind however denies any active plan at this time. She denies any homicidal ideation. Patient reports adequate oral intake and sleep. She has been engaging in group program on the unit. We discussed about disposition plan. Patient states having some anxiety due to worry about if able to return to [facility] versus returning to [facility] but states to be feeling less anxious since talking to . She reports that  reportedly informed her that they will hold her bed at [facility] and she states being looking forward to finish her sentence there when stable.     Appetite:  [x] Adequate/Unchanged  [] Increased  [] Decreased      Sleep:       [x] Adequate/Unchanged  [] Fair  [] Poor      Group Attendance on Unit:   [x] Yes   [] Selectively    [] No    Compliant with scheduled medications: [x] Yes  [] No    Received emergency medications in past 24 hrs: [x] Yes   [] No    Medication Side Effects: Denies         Mental Status Exam  Level of consciousness: Alert and awake   Appearance: Appropriate attire for setting, seated on bed, with fair  grooming and hygiene   Behavior/Motor: Approachable, polite, engages with interviewer, no psychomotor 103  mg/dL Final    Comment: The ADA and AACC recommend providing the estimated average glucose result to permit better   patient understanding of their HBA1c result. Color, UA 09/09/2023 Yellow  Yellow Final    Turbidity UA 09/09/2023 Clear  Clear Final    Glucose, Ur 09/09/2023 NEGATIVE  NEGATIVE mg/dL Final    Bilirubin Urine 09/09/2023 NEGATIVE  NEGATIVE Final    Ketones, Urine 09/09/2023 NEGATIVE  NEGATIVE mg/dL Final    Specific Gravity, UA 09/09/2023 1.014  1.000 - 1.030 Final    Urine Hgb 09/09/2023 NEGATIVE  NEGATIVE Final    pH, UA 09/09/2023 6.0  5.0 - 8.0 Final    Protein, UA 09/09/2023 NEGATIVE  NEGATIVE mg/dL Final    Urobilinogen, Urine 09/09/2023 Normal  0.0 - 1.0 EU/dL Final    Nitrite, Urine 09/09/2023 NEGATIVE  NEGATIVE Final    Leukocyte Esterase, Urine 09/09/2023 NEGATIVE  NEGATIVE Final    Comment 09/09/2023 Microscopic exam not performed based on chemical results unless requested in original order. Final         Reviewed patient's current plan of care and vital signs with nursing staff.     Labs reviewed: [x] Yes  EKG reviewed with QTc 438  Medications  Current Facility-Administered Medications: [START ON 9/12/2023] paliperidone (INVEGA) extended release tablet 9 mg, 9 mg, Oral, Daily  cloNIDine (CATAPRES) tablet 0.1 mg, 0.1 mg, Oral, BID  haloperidol lactate (HALDOL) injection 5 mg, 5 mg, IntraMUSCular, Q6H PRN **AND** diphenhydrAMINE (BENADRYL) injection 50 mg, 50 mg, IntraMUSCular, Q6H PRN  haloperidol (HALDOL) tablet 5 mg, 5 mg, Oral, Q6H PRN  acetaminophen (TYLENOL) tablet 650 mg, 650 mg, Oral, Q4H PRN  aluminum & magnesium hydroxide-simethicone (MAALOX) 200-200-20 MG/5ML suspension 30 mL, 30 mL, Oral, Q6H PRN  hydrOXYzine HCl (ATARAX) tablet 50 mg, 50 mg, Oral, TID PRN  nicotine (NICODERM CQ) 14 MG/24HR 1 patch, 1 patch, TransDERmal, Daily  polyethylene glycol (GLYCOLAX) packet 17 g, 17 g, Oral, Daily PRN  traZODone (DESYREL) tablet 50 mg, 50 mg, Oral, Nightly

## 2023-09-11 NOTE — CARE COORDINATION
Social work spoke with pt regarding discharge. Pt shared she will be returning to [facility]. Social work and pt spoke with  regarding discharge. The  asked that pt contact [facility] at [facility] regarding discharge due to the complex nature of pts case [place] different [place]. Social work and pt attempted to contact [employer] at [phone] ext [phone]. Message was left asking for a return call. Pt signed BART for [facility] [employer].

## 2023-09-11 NOTE — GROUP NOTE
Group Therapy Note    Date: 9/11/2023    Group Start Time: 1100  Group End Time: 1130  Group Topic: Cognitive Skills    TEDDY Orosco, CTRS        Group Therapy Note    Attendees: 7/11       Patient's Goal:  pt will demonstrate improved coping skills and improved concentration     Notes:   pt was pleasant and participated well     Status After Intervention:  Improved    Participation Level:  Active Listener and Interactive    Participation Quality: Appropriate      Speech:  normal      Thought Process/Content: Logical      Affective Functioning: Congruent      Mood: euthymic      Level of consciousness:  Alert      Response to Learning: Able to verbalize current knowledge/experience, Able to verbalize/acknowledge new learning, and Progressing to goal      Endings: None Reported    Modes of Intervention: Support, Socialization, and Activity      Discipline Responsible: Psychoeducational Specialist      Signature:  Weston Callejas

## 2023-09-11 NOTE — GROUP NOTE
Group Therapy Note    Date: 9/11/2023    Group Start Time: 0900  Group End Time: 0930  Group Topic: Community Meeting    [facility] JEFFERY Suazo RN        Group Therapy Note    Attendees: 9    Patient attended and participated in [employer] Group and Goal Setting Group. Patient's Goal:  \"talk to doctor about increasing invega and starting me on the shot. \"    Signature:  Shanel Suazo RN

## 2023-09-11 NOTE — PLAN OF CARE
Problem: Shweta  Goal: Will exhibit normal sleep and speech and no impulsivity  Description: INTERVENTIONS:  1. Administer medication as ordered  2. Set limits on impulsive behavior  3. Make attempts to decrease external stimuli as possible  9/11/2023 0845 by Drake Diaz RN  Outcome: Progressing  Note: Patient shows no signs of shweta at this time. Patient is reporting appropriate sleep. Problem: Depression/Self Harm  Goal: Effect of psychiatric condition will be minimized and patient will be protected from self harm  Description: INTERVENTIONS:  1. Assess impact of patient's symptoms on level of functioning, self care needs and offer support as indicated  2. Assess patient/family knowledge of depression, impact on illness and need for teaching  3. Provide emotional support, presence and reassurance  4. Assess for possible suicidal thoughts or ideation. If patient expresses suicidal thoughts or statements do not leave alone, initiate Suicide Precautions, move to a room close to the nursing station and obtain sitter  5. Initiate consults as appropriate with Mental Health Professional, Spiritual Care, Psychosocial CNS, and consider a recommendation to the LIP for a Psychiatric Consultation  9/11/2023 0845 by Drake Diaz RN  Outcome: Progressing  Note: Pt denied thoughts of SI/HI/self-harm and agreed to seek out staff should thoughts of SI/HI/self-harm arise. Safe environment maintained. Q15 minute checks for safety continued per unit policy. Will continue to monitor for safety and provide support and reassurance as needed.         Problem: Pain  Goal: Verbalizes/displays adequate comfort level or baseline comfort level  9/11/2023 0845 by Drake Diaz RN  Outcome: Progressing

## 2023-09-11 NOTE — BH NOTE
Patient arrived on the unit via wheelchair. Patient was transported by staff, no distress noted. Staff will continue to monitor per policy.

## 2023-09-11 NOTE — PROGRESS NOTES
[facility]  aKnnan Hicks MD; Henna Burkett MD; Julieth Ayoub MD; MD Sangeeta Campa MD; Helena Barnhart MD    [facility] Internal Medicine   [address] [place] [address]    HISTORY AND PHYSICAL EXAMINATION            Date:   9/11/2023  Patient name:  Dennise Wu  Date of admission:  9/8/2023 12:39 AM  MRN:   [id number]  Account:  [de-identified]  YOB: 1990  PCP:    No primary care provider on file. Room:   [address] [place] [address]  Code Status:    Full Code    Chief Complaint:     No chief complaint on file. Hep c      History Obtained From:     Patient medical record    History of Present Illness:     Dennise Wu is a 28 y.o. Non- / non  female who presents with No chief complaint on file. and is admitted to the hospital for the management of Schizoaffective disorder, bipolar type ([address]   28 lady class II obese BMI is 36.5 with a history of street drug abuse history of hepatitis C untreated history of irritable bowel syndrome denies any nausea vomiting diarrhea denies any icterus no right upper quadrant pain  Recent liver function test bilirubin elevated at 1.3 AST ALT ammonia levels within normal range    Past Medical History:     Past Medical History:   Diagnosis Date    Anxiety     Bipolar 1 disorder ([address])     Concussion with brief loss of consciousness 02/19/2017    S/P Fall_Down 13 steps_Tripping over [address]_    Depression     GERD (gastroesophageal reflux disease)     Hepatitis C     IBS (irritable bowel syndrome)     IVDU (intravenous drug user)     History of IV drug use     Neck pain 02/19/2017    S/P Fall_Down 13 steps_Tripping over [address]_    Paresthesia and pain of right extremity 02/19/2017    S/P Fall_Down 13 steps_Tripping over [address]_    Pelvic pain     Prolactin increased     PTSD (post-traumatic stress disorder) Renal calculi 02/21/2022    Schizophrenia ([address])     Suicidal ideation         Past Surgical History:     Past Surgical History:   Procedure Laterality Date    CHOLECYSTECTOMY      CYSTOSCOPY  05/20/2019    hydrodistention    LITHOTRIPSY Right 02/22/2022    OTHER SURGICAL HISTORY  10/06/2022    operative laparoscopy with removal of sterilization clip    SALPINGECTOMY Bilateral 10/06/2022    SINUS SURGERY      tooth removed from sinus cavity    TONSILLECTOMY      TUBAL LIGATION  03/01/2018    Laparoscopic        Medications Prior to Admission:     Prior to Admission medications    Medication Sig Start Date End Date Taking? Authorizing Provider   ALPRAZolam Onetha Zahraa) 0.5 MG tablet Take 1 tablet by mouth 2 times daily as needed. Yes Historical Provider, MD   ARIPiprazole (ABILIFY) 10 MG tablet Take 1 tablet by mouth daily   Yes Historical Provider, MD   hydrOXYzine HCl (ATARAX) 50 MG tablet Take 1 tablet by mouth 3 times daily as needed for Itching   Yes Historical Provider, MD   prazosin (MINIPRESS) 1 MG capsule Take 1 capsule by mouth nightly   Yes Historical Provider, MD   traZODone (DESYREL) 50 MG tablet Take 1 tablet by mouth nightly as needed for Sleep   Yes Historical Provider, MD   clonazePAM (KLONOPIN) 2 MG tablet Take 1 tablet by mouth in the morning, at noon, and at bedtime. Historical Provider, MD        Allergies: Toradol [ketorolac tromethamine], Penicillin g benzathine, Adhesive tape, and Codeine    Social History:     Tobacco:    reports that she has been smoking cigarettes. She has a 7.00 pack-year smoking history. She has never used smokeless tobacco.  Alcohol:      reports that she does not currently use alcohol. Drug Use:  reports current drug use. Frequency: 1.00 time per week. Drug: Marijuana [place]amelie Witt). Family History:     Family History   Adopted: Yes       Review of Systems:     Positive and Negative as described in HPI.     CONSTITUTIONAL:  negative for fevers, chills, sweats, fatigue,

## 2023-09-12 PROCEDURE — 1240000000 HC EMOTIONAL WELLNESS R&B

## 2023-09-12 PROCEDURE — 99232 SBSQ HOSP IP/OBS MODERATE 35: CPT | Performed by: PSYCHIATRY & NEUROLOGY

## 2023-09-12 PROCEDURE — 99231 SBSQ HOSP IP/OBS SF/LOW 25: CPT | Performed by: INTERNAL MEDICINE

## 2023-09-12 PROCEDURE — 6370000000 HC RX 637 (ALT 250 FOR IP): Performed by: PSYCHIATRY & NEUROLOGY

## 2023-09-12 PROCEDURE — 90833 PSYTX W PT W E/M 30 MIN: CPT | Performed by: PSYCHIATRY & NEUROLOGY

## 2023-09-12 PROCEDURE — APPSS30 APP SPLIT SHARED TIME 16-30 MINUTES

## 2023-09-12 RX ORDER — CLONIDINE 0.2 MG/24H
1 PATCH, EXTENDED RELEASE TRANSDERMAL WEEKLY
Status: DISCONTINUED | OUTPATIENT
Start: 2023-09-12 | End: 2023-09-14 | Stop reason: HOSPADM

## 2023-09-12 RX ADMIN — ACETAMINOPHEN 650 MG: 325 TABLET ORAL at 10:18

## 2023-09-12 RX ADMIN — CLONIDINE HYDROCHLORIDE 0.1 MG: 0.1 TABLET ORAL at 08:39

## 2023-09-12 RX ADMIN — PALIPERIDONE 9 MG: 9 TABLET, EXTENDED RELEASE ORAL at 08:39

## 2023-09-12 RX ADMIN — HYDROXYZINE HYDROCHLORIDE 50 MG: 50 TABLET, FILM COATED ORAL at 10:18

## 2023-09-12 RX ADMIN — ACETAMINOPHEN 650 MG: 325 TABLET ORAL at 16:34

## 2023-09-12 RX ADMIN — TRAZODONE HYDROCHLORIDE 50 MG: 50 TABLET ORAL at 20:34

## 2023-09-12 RX ADMIN — CLONIDINE HYDROCHLORIDE 0.1 MG: 0.1 TABLET ORAL at 20:34

## 2023-09-12 RX ADMIN — HYDROXYZINE HYDROCHLORIDE 50 MG: 50 TABLET, FILM COATED ORAL at 16:02

## 2023-09-12 ASSESSMENT — PAIN SCALES - GENERAL
PAINLEVEL_OUTOF10: 1
PAINLEVEL_OUTOF10: 1
PAINLEVEL_OUTOF10: 3

## 2023-09-12 ASSESSMENT — PAIN DESCRIPTION - DESCRIPTORS: DESCRIPTORS: ACHING

## 2023-09-12 ASSESSMENT — PAIN DESCRIPTION - LOCATION: LOCATION: HEAD

## 2023-09-12 NOTE — BH NOTE
Patient provided address to [facility] for discharge disposition. Face sheet was updated to reflect new address.

## 2023-09-12 NOTE — GROUP NOTE
Group Therapy Note    Date: 9/12/2023    Group Start Time: 1430  Group End Time: 1965  Group Topic: Cognitive Skills    TEDDY Ying, ANJANAS        Group Therapy Note    Attendees: 4/10     Patient's Goal: Topic: To increase social interaction, following task directions, concentration, and communication skills        Notes: Pt was polite and cooperative. Pt was able to practice following task directions, concentration , and communication skills        Status After Intervention: Improved         Participation Level: Active Listener ,sharing ,supportive     Participation Quality:  Attentive ,  sharing ,supportive     Speech:  Normal     Thought Process/Content: Logical , linear r/t task      Affective Functioning: Blunted, brightened     Mood: Cooperative and polite but somewhat seclusive to self with peers - pt appears mildly annoyed by one less able peer at times but does not express to peer and remains in behavioral control. Level of consciousness:  Alert, attentive     Response to Learning: Able to demonstrate current knowledge/experience , Able to acknowledge/verbalize new learning, and Progressing to goal        Endings: None Reported     Modes of Intervention: Education, Support, Socialization, Exploration, Clarifying and   Problem-solving.         Discipline Responsible: Psychoeducational Specialist      Signature:  Mary Guy

## 2023-09-12 NOTE — PROGRESS NOTES
[facility]  Enrique Nathan MD; Robin Armijo MD; Marcia Wright MD; MD Stevenson Juan MD; MD ABDULLAHI [facility] Internal Medicine   [address] [place] [address]    HISTORY AND PHYSICAL EXAMINATION            Date:   9/12/2023  Patient name:  Lima Grant  Date of admission:  9/8/2023 12:39 AM  MRN:   [id number]  Account:  [de-identified]  YOB: 1990  PCP:    No primary care provider on file. Room:   [address] [place] [address]  Code Status:    Full Code    Chief Complaint:     No chief complaint on file. Hep c      History Obtained From:     Patient medical record    History of Present Illness:     Lima Grant is a 28 y.o. Non- / non  female who presents with No chief complaint on file. and is admitted to the hospital for the management of Schizoaffective disorder, bipolar type ([address] ).   28 lady class II obese BMI is 36.5 with a history of street drug abuse history of hepatitis C untreated history of irritable bowel syndrome denies any nausea vomiting diarrhea denies any icterus no right upper quadrant pain  Recent liver function test bilirubin elevated at 1.3 AST ALT ammonia levels within normal range    Past Medical History:     Past Medical History:   Diagnosis Date    Anxiety     Bipolar 1 disorder ([address])     Concussion with brief loss of consciousness 02/19/2017    S/P Fall_Down 13 steps_Tripping over [address] [facility]_    Depression     GERD (gastroesophageal reflux disease)     Hepatitis C     IBS (irritable bowel syndrome)     IVDU (intravenous drug user)     History of IV drug use     Neck pain 02/19/2017    S/P Fall_Down 13 steps_Tripping over [address] [facility]_    Paresthesia and pain of right extremity 02/19/2017    S/P Fall_Down 13 steps_Tripping over [address] [facility]_    Pelvic pain     Prolactin increased     PTSD (post-traumatic stress disorder)

## 2023-09-12 NOTE — CARE COORDINATION
Social work received call from Rajat at [place] [id number] who shared they are not able to take pt back into  their program and pt needs to contact . Social work met with pt and assisted pt with attempting to contact the  regarding a discharge plan, pt left message. Social work offered support while discussing the options pt may have.

## 2023-09-12 NOTE — PLAN OF CARE
Problem: Depression/Self Harm  Goal: Effect of psychiatric condition will be minimized and patient will be protected from self harm  Description: INTERVENTIONS:  1. Assess impact of patient's symptoms on level of functioning, self care needs and offer support as indicated  2. Assess patient/family knowledge of depression, impact on illness and need for teaching  3. Provide emotional support, presence and reassurance  4. Assess for possible suicidal thoughts or ideation. If patient expresses suicidal thoughts or statements do not leave alone, initiate Suicide Precautions, move to a room close to the nursing station and obtain sitter  5. Initiate consults as appropriate with Mental Health Professional, Spiritual Care, Psychosocial CNS, and consider a recommendation to the LIP for a Psychiatric Consultation  9/11/2023 2007 by Adriana Santiago RN  Outcome: Progressing   Denies wanting to harm self & behaviors reflect same. Problem: Pain  Goal: Verbalizes/displays adequate comfort level or baseline comfort level  9/11/2023 2007 by Adriana Santiago RN  Outcome: Progressing   Denies current pain.

## 2023-09-12 NOTE — PLAN OF CARE
Problem: Depression/Self Harm  Goal: Effect of psychiatric condition will be minimized and patient will be protected from self harm  Description: INTERVENTIONS:  1. Assess impact of patient's symptoms on level of functioning, self care needs and offer support as indicated  2. Assess patient/family knowledge of depression, impact on illness and need for teaching  3. Provide emotional support, presence and reassurance  4. Assess for possible suicidal thoughts or ideation. If patient expresses suicidal thoughts or statements do not leave alone, initiate Suicide Precautions, move to a room close to the nursing station and obtain sitter  5. Initiate consults as appropriate with Mental Health Professional, Spiritual Care, Psychosocial CNS, and consider a recommendation to the LIP for a Psychiatric Consultation  Outcome: Progressing  Note: Pt denied thoughts of SI/HI/self-harm and agreed to seek out staff should thoughts of SI/HI/self-harm arise. Safe environment maintained. Q15 minute checks for safety continued per unit policy. Will continue to monitor for safety and provide support and reassurance as needed.         Problem: Pain  Goal: Verbalizes/displays adequate comfort level or baseline comfort level  Outcome: Progressing

## 2023-09-12 NOTE — GROUP NOTE
Group Therapy Note    Date: 9/12/2023    Group Start Time: 1030  Group End Time: 7531  Group Topic: Cognitive Skills    NAVEEN Healy        Group Therapy Note    Attendees: 6/12       Patient's Goal:  pt will demonstrate improved coping skills and improved concentration     Notes:   pt was pleasant and participated well     Status After Intervention:  Improved    Participation Level:  Active Listener and Interactive    Participation Quality: Appropriate, Sharing, and Supportive      Speech:  normal      Thought Process/Content: Logical      Affective Functioning: Congruent      Mood: euthymic      Level of consciousness:  Alert      Response to Learning: Able to verbalize current knowledge/experience, Able to retain information, and Progressing to goal      Endings: None Reported    Modes of Intervention: Education, Support, and Activity      Discipline Responsible: Psychoeducational Specialist      Signature:  Home Zuniga

## 2023-09-12 NOTE — PROGRESS NOTES
Daily Progress Note  9/12/2023    Patient Name: Dennise Wu    CHIEF COMPLAINT:  Depression with suicidal ideation. SUBJECTIVE:      Patient is seen today for a follow up assessment at bedside where she is seen reading a book. On approach patient is calm, pleasant. She reports tolerating titration of Invega well with no side effects. Patient states and continued improvement in symptoms. She is open to receiving long-acting injection today. On approach patient displays forward thinking is looking forward to transitioning care to [facility] discussed the possibility of potential discharge on Thursday if continued stability and tolerating injection, patient agreeable. Patient was scheduled medication. She has remained in behavioral control and not medication. Patient denies any medication side effects or any other concerns this time. Appetite:  [x] Adequate/Unchanged  [] Increased  [] Decreased      Sleep:       [x] Adequate/Unchanged  [] Fair  [] Poor      Group Attendance on Unit:   [x] Yes   [] Selectively    [] No    Compliant with scheduled medications: [x] Yes  [] No    Received emergency medications in past 24 hrs: [x] Yes   [] No    Medication Side Effects: Denies         Mental Status Exam  Level of consciousness: Alert and awake   Appearance: Appropriate attire for setting, seated on bed, with fair  grooming and hygiene   Behavior/Motor: Approachable, polite, engages with interviewer, no psychomotor abnormalities   Attitude toward examiner: Cooperative, attentive, good eye contact  Speech: normal rate, normal volume, and well articulated   Mood: Patient reports \"stable\"  Affect: Congruent, bright  Thought processes: linear and coherent  Thought content: Denies homicidal ideation  Suicidal Ideation: Endorses improvement suicidal ideations, contracts for safety on the unit.    Delusions: No evidence of delusions  Perceptual Disturbance: Endorses improvement auditory hallucinations, stabilization. Patient continues to need, on a daily basis, active treatment furnished directly by or requiring the supervision of inpatient psychiatric personnel. Electronically signed by VELIA Conley CNP on 9/12/2023 at 10:49 AM    **This report has been created using voice recognition software. It may contain minor errors which are inherent in voice recognition technology. **     I independently saw and evaluated the patient. I reviewed the nurse practitioners documentation above. Principle diagnosis we are treating for is Schizoaffective disorder, bipolar type ([address]). Any additional comments or changes to the nurse practitioners documentation are stated below otherwise agree with assessment. Spent 17 minutes with the patient in supportive psychotherapy. Plan will be as follows:    Patient denying side effects to medication. After discussion of risk benefits and alternatives she is agreeable to both long-acting injectable of Invega as well as starting clonidine patch. PLAN  Patient s symptoms   are improving  Long-acting injectable of Invega Sustenna  Clonidine 0.2 mg per 24-hour transdermal patch  Attempt to develop insight  Psycho-education conducted. Supportive Therapy conducted.   Probable discharge is Thursday  Follow-up daily while on inpatient unit

## 2023-09-12 NOTE — PROGRESS NOTES
09/12/23 1351   Encounter Summary   Encounter Overview/Reason  Spiritual/Emotional Needs   Service Provided For: Patient   Referral/Consult From: Rounding   Last Encounter  09/12/23   Complexity of Encounter Moderate   Begin Time 0130   End Time  0200   Total Time Calculated 30 min   Spiritual/Emotional needs   Type Spiritual Support   Behavioral Health    Type  Spirituality Group   Assessment/Intervention/Outcome   Assessment Calm   Intervention Active listening;Nurtured Hope;Sustaining Presence/Ministry of presence   Outcome Engaged in conversation;Receptive

## 2023-09-12 NOTE — BH NOTE
Writer spoke with  and patient at this time.  stated that the patient is accepted to [facility]  which is a treatment facility, and they would be able to transport her. Officer stated to writer that she will call with information on where medications need to be sent for this facility at discharge.  provided phone numbers for both places incase they need to be contacted. BART's were signed for both facilities and placed in paper chart.      [facility]

## 2023-09-12 NOTE — CARE COORDINATION
Social work attempted to contact pts  (BART) Nedra Nunez at [phone] ext [phone] to coordinate care. Social work left message.

## 2023-09-13 PROCEDURE — 6370000000 HC RX 637 (ALT 250 FOR IP): Performed by: PSYCHIATRY & NEUROLOGY

## 2023-09-13 PROCEDURE — APPSS30 APP SPLIT SHARED TIME 16-30 MINUTES

## 2023-09-13 PROCEDURE — 1240000000 HC EMOTIONAL WELLNESS R&B

## 2023-09-13 PROCEDURE — 90833 PSYTX W PT W E/M 30 MIN: CPT | Performed by: PSYCHIATRY & NEUROLOGY

## 2023-09-13 PROCEDURE — 99232 SBSQ HOSP IP/OBS MODERATE 35: CPT | Performed by: PSYCHIATRY & NEUROLOGY

## 2023-09-13 RX ORDER — PALIPERIDONE 9 MG/1
9 TABLET, EXTENDED RELEASE ORAL DAILY
Qty: 5 TABLET | Refills: 0 | Status: SHIPPED | OUTPATIENT
Start: 2023-09-14 | End: 2023-09-19

## 2023-09-13 RX ORDER — HYDROXYZINE 50 MG/1
50 TABLET, FILM COATED ORAL 3 TIMES DAILY PRN
Qty: 30 TABLET | Refills: 0 | Status: SHIPPED | OUTPATIENT
Start: 2023-09-13

## 2023-09-13 RX ORDER — CLONIDINE 0.2 MG/24H
1 PATCH, EXTENDED RELEASE TRANSDERMAL WEEKLY
Qty: 4 PATCH | Refills: 0 | Status: SHIPPED | OUTPATIENT
Start: 2023-09-19 | End: 2023-09-14 | Stop reason: SDUPTHER

## 2023-09-13 RX ORDER — NICOTINE 21 MG/24HR
1 PATCH, TRANSDERMAL 24 HOURS TRANSDERMAL DAILY
Qty: 30 PATCH | Refills: 3 | Status: SHIPPED | OUTPATIENT
Start: 2023-09-14

## 2023-09-13 RX ORDER — CLONIDINE HYDROCHLORIDE 0.1 MG/1
0.1 TABLET ORAL NIGHTLY
Status: DISCONTINUED | OUTPATIENT
Start: 2023-09-13 | End: 2023-09-14 | Stop reason: HOSPADM

## 2023-09-13 RX ORDER — CLONIDINE HYDROCHLORIDE 0.1 MG/1
0.1 TABLET ORAL NIGHTLY
Qty: 1 TABLET | Refills: 0 | Status: SHIPPED | OUTPATIENT
Start: 2023-09-13 | End: 2023-09-14

## 2023-09-13 RX ORDER — TRAZODONE HYDROCHLORIDE 50 MG/1
50 TABLET ORAL NIGHTLY PRN
Qty: 30 TABLET | Refills: 0 | Status: SHIPPED | OUTPATIENT
Start: 2023-09-13

## 2023-09-13 RX ADMIN — HYDROXYZINE HYDROCHLORIDE 50 MG: 50 TABLET, FILM COATED ORAL at 18:02

## 2023-09-13 RX ADMIN — PALIPERIDONE 9 MG: 9 TABLET, EXTENDED RELEASE ORAL at 07:47

## 2023-09-13 RX ADMIN — TRAZODONE HYDROCHLORIDE 50 MG: 50 TABLET ORAL at 20:41

## 2023-09-13 RX ADMIN — CLONIDINE HYDROCHLORIDE 0.1 MG: 0.1 TABLET ORAL at 20:41

## 2023-09-13 RX ADMIN — CLONIDINE HYDROCHLORIDE 0.1 MG: 0.1 TABLET ORAL at 07:47

## 2023-09-13 ASSESSMENT — PAIN SCALES - GENERAL: PAINLEVEL_OUTOF10: 7

## 2023-09-13 ASSESSMENT — PAIN DESCRIPTION - DESCRIPTORS: DESCRIPTORS: ACHING

## 2023-09-13 NOTE — CARE COORDINATION
SASHA attempted to reach Chey Betancourt, who is identified as patient's , to coordinate discharge plans. Chey Betancourt states she is not patient's , advised SASHA to speak with Abelardo Laird, , at [phone], SASHA left voice mail.

## 2023-09-13 NOTE — CARE COORDINATION
Bernadine spoke with Officer Tamara Walters today regarding pt discharge. Officer Tamara Walters states she and Missy POPE () will be transporting patient to the new A[facility] program \"[facility]\" and that they will transport patient to the program at noon. [address], [address] [facility].

## 2023-09-13 NOTE — PROGRESS NOTES
Daily Progress Note  9/13/2023    Patient Name: Dakota Schumacher    CHIEF COMPLAINT:  Depression with suicidal ideation. SUBJECTIVE:      Patient was meet on unit. When approached, patient declined need for privacy. Interview occurred in the milieu. Patient states she is feeling much better and relates this to being back on medication. Patient states hospitalization and medication has helped to reduce her voices, resulting in improvement in depression and no suicidal ideation. Patient reports previously being forgetful resulting in noncompliance of medication in the community. Patient is hopeful of ongoing treatment with long-acting injection and feels she is able to contract for safety outside the hospital.  Patient is denying auditory and visual hallucinations and reports no medication side effects. Appetite:  [x] Adequate/Unchanged  [] Increased  [] Decreased      Sleep:       [x] Adequate/Unchanged  [] Fair  [] Poor      Group Attendance on Unit:   [x] Yes   [] Selectively    [] No    Compliant with scheduled medications: [x] Yes  [] No    Received emergency medications in past 24 hrs: [x] Yes   [] No    Medication Side Effects: Denies         Mental Status Exam  Level of consciousness: Alert and awake   Appearance: Appropriate attire for setting, seated on bed, with fair  grooming and hygiene   Behavior/Motor: Approachable, polite, engages with interviewer, no psychomotor abnormalities   Attitude toward examiner: Cooperative, attentive, good eye contact  Speech: normal rate, normal volume, and well articulated   Mood: Patient reports \"Good\"  Affect: Congruent, bright  Thought processes: linear and coherent  Thought content: Denies homicidal ideation  Suicidal Ideation: Denies suicidal ideations, contracts for safety on the unit. Delusions: No evidence of delusions  Perceptual Disturbance: Denies hallucinations, patient does not appear to be responding to internal stimuli.   Denies visual

## 2023-09-13 NOTE — GROUP NOTE
Group Therapy Note    Date: 9/13/2023    Group Start Time: 1430  Group End Time: 1520  Group Topic: Cognitive Skills    NAVEEN Travis        Group Therapy Note    Attendees: 3/9     Patient's Goal: Topic: To increase social interaction, decision making , concentration, and communication skills         Notes: Pt was pleasant and cooperative. Pt was able to practice decision making , concentration, and communication skills         Status After Intervention: Improved         Participation Level: Active Listener ,sharing ,supportive     Participation Quality:  Attentive ,  sharing ,supportive     Speech: Normal     Thought Process/Content: Logical , linear r/t group task and topic. Affective Functioning: Congruent, brightened     Mood: Social, cooperative, euthymic     Level of consciousness:  Alert, attentive     Response to Learning: Able to demonstrate current knowledge/experience , Able to acknowledge/verbalize new learning , and Progressing to goal        Endings: None Reported     Modes of Intervention: Education, Support, Socialization, Exploration, Clarifying and   Problem-solving.         Discipline Responsible: Psychoeducational Specialist      Signature:  Apurva Simons

## 2023-09-13 NOTE — PLAN OF CARE
Problem: Depression/Self Harm  Goal: Effect of psychiatric condition will be minimized and patient will be protected from self harm  Description: INTERVENTIONS:  1. Assess impact of patient's symptoms on level of functioning, self care needs and offer support as indicated  2. Assess patient/family knowledge of depression, impact on illness and need for teaching  3. Provide emotional support, presence and reassurance  4. Assess for possible suicidal thoughts or ideation. If patient expresses suicidal thoughts or statements do not leave alone, initiate Suicide Precautions, move to a room close to the nursing station and obtain sitter  5. Initiate consults as appropriate with Mental Health Professional, Spiritual Care, Psychosocial CNS, and consider a recommendation to the LIP for a Psychiatric Consultation  9/13/2023 0912 by Nila Fox RN  Outcome: Progressing    Patient is observed in the day room watching tv. Patient is medication compliant and denies any side effects. Patient reports adequate sleep and rest. Patient denies hearing any voices. Patient denies homicidal or suicidal ideation. Patient appearance is disheveled and was encouraged to shower. Patient reports Anxiety 6 out of 10 being the worse. Coping skills were explored. Patient have been attending groups and socializing with peers. Patient has been in behavioral control thus far.      Problem: Pain  Goal: Verbalizes/displays adequate comfort level or baseline comfort level  9/13/2023 0912 by Nila Fox RN  Outcome: Progressing

## 2023-09-13 NOTE — PLAN OF CARE
Problem: Depression/Self Harm  Goal: Effect of psychiatric condition will be minimized and patient will be protected from self harm  Description: INTERVENTIONS:  1. Assess impact of patient's symptoms on level of functioning, self care needs and offer support as indicated  2. Assess patient/family knowledge of depression, impact on illness and need for teaching  3. Provide emotional support, presence and reassurance  4. Assess for possible suicidal thoughts or ideation. If patient expresses suicidal thoughts or statements do not leave alone, initiate Suicide Precautions, move to a room close to the nursing station and obtain sitter  5. Initiate consults as appropriate with Mental Health Professional, Spiritual Care, Psychosocial CNS, and consider a recommendation to the LIP for a Psychiatric Consultation  9/12/2023 2040 by Mora Landers, RN  Outcome: Progressing   Denies wanting to harm self & behaviors reflect same. Problem: Pain  Goal: Verbalizes/displays adequate comfort level or baseline comfort level  9/12/2023 2040 by Mora Landers, RN  Outcome: Progressing   Denies current pain.

## 2023-09-13 NOTE — GROUP NOTE
Group Therapy Note    Date: 9/13/2023    Group Start Time: 1030  Group End Time: 1100  Group Topic: Cognitive Skills    NAVEEN Alvarado        Group Therapy Note    Attendees: 4/10       Patient's Goal:  pt will demonstrate improved coping skills and improved decision making skills     Notes:   pt was pleasant and participated well     Status After Intervention:  Improved    Participation Level:  Active Listener and Interactive    Participation Quality: Appropriate, Attentive, and Sharing      Speech:  normal      Thought Process/Content: Logical      Affective Functioning: Congruent      Mood: euthymic      Level of consciousness:  Alert      Response to Learning: Able to verbalize current knowledge/experience, Able to verbalize/acknowledge new learning, and Progressing to goal      Endings: None Reported    Modes of Intervention: Support, Socialization, Problem-solving, and Activity      Discipline Responsible: Psychoeducational Specialist      Signature:  Wilfredo Tolentino

## 2023-09-14 VITALS
SYSTOLIC BLOOD PRESSURE: 120 MMHG | HEIGHT: 63 IN | HEART RATE: 106 BPM | OXYGEN SATURATION: 99 % | WEIGHT: 206 LBS | RESPIRATION RATE: 12 BRPM | TEMPERATURE: 98.2 F | BODY MASS INDEX: 36.5 KG/M2 | DIASTOLIC BLOOD PRESSURE: 81 MMHG

## 2023-09-14 PROCEDURE — 99239 HOSP IP/OBS DSCHRG MGMT >30: CPT | Performed by: PSYCHIATRY & NEUROLOGY

## 2023-09-14 PROCEDURE — 6370000000 HC RX 637 (ALT 250 FOR IP): Performed by: PSYCHIATRY & NEUROLOGY

## 2023-09-14 RX ORDER — CLONIDINE 0.2 MG/24H
1 PATCH, EXTENDED RELEASE TRANSDERMAL WEEKLY
Qty: 4 PATCH | Refills: 0 | Status: SHIPPED | OUTPATIENT
Start: 2023-09-19

## 2023-09-14 RX ADMIN — HYDROXYZINE HYDROCHLORIDE 50 MG: 50 TABLET, FILM COATED ORAL at 08:49

## 2023-09-14 RX ADMIN — ACETAMINOPHEN 650 MG: 325 TABLET ORAL at 11:26

## 2023-09-14 RX ADMIN — PALIPERIDONE 9 MG: 9 TABLET, EXTENDED RELEASE ORAL at 07:57

## 2023-09-14 ASSESSMENT — PAIN SCALES - GENERAL: PAINLEVEL_OUTOF10: 3

## 2023-09-14 ASSESSMENT — PAIN DESCRIPTION - LOCATION: LOCATION: HEAD

## 2023-09-14 NOTE — GROUP NOTE
Group Therapy Note    Date: 9/14/2023    Group Start Time: 1100  Group End Time: 1130  Group Topic: Cognitive Skills    NAVEEN Xie        Group Therapy Note    Attendees: 4/11       Patient's Goal:  pt will demonstrate improved coping skills and improved socialization     Notes:   pt was pleasant and participated well     Status After Intervention:  Improved    Participation Level:  Active Listener and Interactive    Participation Quality: Appropriate, Sharing, and Supportive      Speech:  normal      Thought Process/Content: Logical      Affective Functioning: Congruent      Mood: euthymic      Level of consciousness:  Alert      Response to Learning: Able to verbalize current knowledge/experience and Progressing to goal      Endings: None Reported    Modes of Intervention: Education, Support, and Activity      Discipline Responsible: Psychoeducational Specialist      Signature:  Layman Real

## 2023-09-14 NOTE — DISCHARGE SUMMARY
injection Inject 156 mg into the muscle every 30 days, IntraMUSCular, EVERY 30 DAYS Starting Tue 9/19/2023, Disp-1 each, R-0, Normal      traZODone (DESYREL) 50 MG tablet Take 1 tablet by mouth nightly as needed for Sleep, Disp-30 tablet, R-0Normal           STOP taking these medications       ALPRAZolam (XANAX) 0.5 MG tablet Comments:   Reason for Stopping:         ARIPiprazole (ABILIFY) 10 MG tablet Comments:   Reason for Stopping:         prazosin (MINIPRESS) 1 MG capsule Comments:   Reason for Stopping:         clonazePAM (KLONOPIN) 2 MG tablet Comments:   Reason for Stopping:         acetaminophen (TYLENOL) 500 MG tablet Comments:   Reason for Stopping:                Core Measures statement:   Not applicable    Discharge Exam:  Level of consciousness:  Within normal limits  Appearance: Street clothes, seated, with good grooming  Behavior/Motor: No abnormalities noted  Attitude toward examiner:  Cooperative, attentive, good eye contact  Speech:  spontaneous, normal rate, normal volume and well articulated  Mood:  euthymic  Affect:  Full range  Thought processes:  linear, goal directed and coherent  Thought content:  denies homicidal ideation  Suicidal Ideation:  denies suicidal ideation  Delusions:  no evidence of delusions  Perceptual Disturbance:  denies any perceptual disturbance  Cognition:  Intact  Memory: age appropriate  Insight & Judgement: fair  Medication side effects: denies     Disposition: home    Patient Instructions: Activity: activity as tolerated  1. Patient instructed to take medications regularly and follow up with outpatient appointments. Follow-up as scheduled with outpatient [facility]      Signed:    Electronically signed by Daiana Hill MD on 9/14/23 at 7:27 PM EDT    Time Spent on discharge is more than 30 minutes in the examination, evaluation, counseling and review of medications and discharge plan.

## 2023-09-14 NOTE — PROGRESS NOTES
CLINICAL PHARMACY NOTE: MEDS TO BEDS    Total # of Prescriptions Filled: 5   The following medications were delivered to the patient:  Nicotine 14mg/24hrpatch  Clonidine 0.1mg  Hydroxyzine 50mg  Paliperidone Er9mg  Trazodone 50mg    Additional Documentation:  Patient is Eligible to Utilize Meds To Beds for Their Discharge MedicationsDelivered Medication to [address] [place] [address] -09/13/

## 2023-09-14 NOTE — BH NOTE
Atarax 50mg PO PRN was given for increased anxiety per patient. Patient was offered 1:1 talk time, quiet time and diversions. Patient is accepting of medication.

## 2023-09-14 NOTE — BH NOTE
[facility]  Discharge Note    Pt discharged with followings belongings:   Dental Appliances: None  Vision - Corrective Lenses: None  Hearing Aid: None  Jewelry: Bracelet, Other (Comment) (plastic, put into bin)  Body Piercings Removed: N/A  Clothing: Footwear, Undergarments, Shirt, Pajamas, Socks, Sweater  Other Valuables: Other (Comment) (none)   Valuables sent home withpatient or returned to patient. Patient educated on aftercare instructions: yes  Information given  to  by staff  at 1:33 PM .Patient verbalize understanding of AVS:  yes. Status EXAM upon discharge:  Mental Status and Behavioral Exam  Normal: Yes  Level of Assistance: Independent/Self  Facial Expression: Brightened  Affect: Appropriate  Level of Consciousness: Alert  Frequency of Checks: 4 times per hour, close  Mood:Normal: No  Mood: Anxious  Motor Activity:Normal: Yes  Eye Contact: Good  Observed Behavior: Cooperative, Friendly  Sexual Misconduct History: Current - no  Preception: [place] to person, [place] to time, [place] to place, [place] to situation  Attention:Normal: Yes  Attention: Unable to concentrate  Thought Processes: Unremarkable  Thought Content:Normal: Yes  Thought Content: Preoccupations  Depression Symptoms: No problems reported or observed. Anxiety Symptoms: Generalized  Shweta Symptoms: No problems reported or observed.   Hallucinations: None  Delusions: No  Memory:Normal: Yes  Memory: Poor recent  Insight and Judgment: Yes  Insight and Judgment: Poor insight    Tobacco Screening:  Practical Counseling, on admission, rachel X, if applicable and completed (first 3 are required if patient doesn't refuse):            ( ) Recognizing danger situations (included triggers and roadblocks)                    ( ) Coping skills (new ways to manage stress,relaxation techniques, changing routine, distraction)                                                           ( ) Basic information about quitting (benefits

## 2023-09-14 NOTE — GROUP NOTE
Group Therapy Note    Date: 9/14/2023    Group Start Time: 0900  Group End Time: 0915  Group Topic: Community Meeting    TEDDY KIM    Samantha Butterfield        Group Therapy Note    Attendees: 8/11     Patient's Goal: Patient will verbalize today's goals. Patient will also offer supportive listening and interact with peers to                            clarify and understand clearly set goals. Notes: Patient is making progress AEB participating in group discussion, actively listening, and supporting other group                members. Status After Intervention: Improved      Participation Level:  Active Listener and Interactive      Participation Quality: Appropriate, Attentive, Sharing, and Supportive      Speech: normal      Thought Process/Content: Logical, Linear      Affective Functioning: Congruent      Mood: anxious      Level of consciousness: Oriented x4      Response to Learning: Able to verbalize current knowledge/experience, Able to verbalize/acknowledge new learning,                                         Able to retain information, and Capable of insight      Endings: None Reported      Modes of Intervention: Education, Support, Socialization, and Problem-solving         Discipline Responsible: Behavorial Health Tech      Signature: Samantha Butterfield no

## 2023-09-14 NOTE — BH NOTE
Emergency Medication Follow-Up Note:     PRN medication of Atarax 50mg PO PRN was effective as evidence by resting quietly and attending groups. Patient denies medication side effects. Will continue to monitor and provide support as needed.

## 2023-09-14 NOTE — BH NOTE
Patient given tobacco quitline number 39841342899 at this time, refusing to call at this time, states \" I just dont want to quit now\"- patient given information as to the dangers of long term tobacco use. Continue to reinforce the importance of tobacco cessation.

## 2023-09-14 NOTE — GROUP NOTE
Group Therapy Note    Date: 9/13/2023    Group Start Time: 2000  Group End Time: 2030  Group Topic: Wrap-Up    TEDDY Haile        Group Therapy Note    Attendees: 10/5       Patient's Goal:  Sobriety    Notes:  going to [facility] upon D/C    Status After Intervention:  Improved    Participation Level:  Active Listener and Interactive    Participation Quality: Appropriate and Sharing      Speech:  normal      Thought Process/Content: Logical      Affective Functioning: Congruent      Mood:  WNL      Level of consciousness:  Alert, Oriented x4, and Attentive      Response to Learning: Able to verbalize current knowledge/experience      Endings: None Reported    Modes of Intervention: Problem-solving      Discipline Responsible: [employer]      Signature:  Yvonne Retana

## 2023-09-14 NOTE — GROUP NOTE
Group Therapy Note    Date: 9/14/2023    Group Start Time: 1000  Group End Time: 1030  Group Topic: Psychotherapy    ZITA KIM    HERBIE Houser LSW        Group Therapy Note    Attendees: 3/10       Patient's Goal:  Expression of feeling    Notes:  Therapeutic conversation card deck discussed    Status After Intervention:  Improved    Participation Level: Interactive    Participation Quality: Sharing      Speech:  normal      Thought Process/Content: Logical      Affective Functioning: Congruent      Mood: euthymic      Level of consciousness:  Alert and Oriented x4      Response to Learning: Able to verbalize current knowledge/experience      Endings: None Reported    Modes of Intervention: Support      Discipline Responsible: /Counselor      Signature:  HERBIE Houser LSW

## 2023-09-14 NOTE — DISCHARGE INSTRUCTIONS
Information:  Medications:   Medication summary provided   I understand that I should take only the medications on my list.     -why and when I need to take each medicine.     -which side effects to watch for.     -that I should carry my medication information at all times in case of     Emergency situations. I will take all of my medicines to follow up appointments.     -check with my physician or pharmacist before taking any new    Medication, over the counter product or drink alcohol.    -Ask about food, drug or dietary supplement interactions.    -discard old lists and update records with medication providers. Notify Physician:  Notify physician if you notice:   Always call 911 if you feel your life is in danger  In case of an emergency call 911 immediately! If 911 is not available call your local emergency medical system for help    Behavioral Health Follow Up:  Original Referral Source:[facility]  Discharge Diagnosis: Depression with suicidal ideation [F32. A, R45.851]  Recommendations for Level of Care: follow up  Patient status at discharge: stable  My hospital  was: [facility]  Aftercare plan faxed: yes   -faxed by: staff   -date: 9/14/23   -time: 1100  Prescriptions: filled at [facility] outpatient pharmacy    Smoking: Quit Smoking. Call the [employer]'s smoking quitline at 5-599-73G-QUIT  Know the signs of a heart attack   If you have any of the following symptoms call 911 immediately, do not wait more    Than five minutes. 1. Pressure, fullness and/ or squeezing in the center of the chest spreading to    The jaw, neck or shoulder. 2. Chest discomfort with light headedness, fainting, sweating, nausea or    Shortness of breath. 3. Upper abdominal pressure or discomfort. 4. Lower chest pain, back pain, unusual fatigue, shortness of breath, nausea   Or dizziness.      General Information:   Questions regarding your bill: Call HELP program [phone]     Suicide Hotline ([facility]

## 2023-09-14 NOTE — PLAN OF CARE
Problem: Depression/Self Harm  Goal: Effect of psychiatric condition will be minimized and patient will be protected from self harm  Description: INTERVENTIONS:  1. Assess impact of patient's symptoms on level of functioning, self care needs and offer support as indicated  2. Assess patient/family knowledge of depression, impact on illness and need for teaching  3. Provide emotional support, presence and reassurance  4. Assess for possible suicidal thoughts or ideation. If patient expresses suicidal thoughts or statements do not leave alone, initiate Suicide Precautions, move to a room close to the nursing station and obtain sitter  5. Initiate consults as appropriate with Mental Health Professional, Spiritual Care, Psychosocial CNS, and consider a recommendation to the LIP for a Psychiatric Consultation  9/13/2023 2052 by Yvonne Retana  Outcome: Adequate for Discharge  Pt denies suicidal ideation. No self harm behaviors exhibited. Problem: Pain  Goal: Verbalizes/displays adequate comfort level or baseline comfort level  9/13/2023 2052 by Yvonne Retana  Outcome: Adequate for Discharge  Pt is cooperative with directions. Compliant with medicine. Has a good appetite. Openly relating her issues with substance abuse. Relates she will be going to a [facility] upon D/C. Pt verbalizes insight into her behaviors & mental health issues. She attends group with good participation.

## 2023-09-14 NOTE — CARE COORDINATION
Name: Leander Espinoza    : 1990    Discharge Date: 23    Primary Auth/Cert #: [id number]    Destination: Patient transported with belongings and filled scripts by  to [place], [place]    Discharge Medications:      Medication List        START taking these medications      cloNIDine 0.1 MG tablet  Commonly known as: CATAPRES  Take 1 tablet by mouth at bedtime for 1 dose  Notes to patient: Blood pressure/anxiety     cloNIDine 0.2 MG/24HR Ptwk  Commonly known as: CATAPRES  Place 1 patch onto the skin once a week  Start taking on: 2023     nicotine 14 MG/24HR  Commonly known as: [address] 1 patch onto the skin daily  Notes to patient: Smoking cessation     paliperidone 9 MG extended release tablet  Commonly known as: INVEGA  Take 1 tablet by mouth daily for 5 days  Notes to patient: clears thoughts     paliperidone palmitate  MG/ML Litzy IM injection  Commonly known as: INVEGA SUSTENNA  Inject 156 mg into the muscle every 30 days  Start taking on: 2023  Notes to patient: Clears thoughts            CONTINUE taking these medications      hydrOXYzine HCl 50 MG tablet  Commonly known as: ATARAX  Take 1 tablet by mouth 3 times daily as needed for Itching  Notes to patient: anxiety     traZODone 50 MG tablet  Commonly known as: DESYREL  Take 1 tablet by mouth nightly as needed for Sleep  Notes to patient: Sleep aid            STOP taking these medications      ALPRAZolam 0.5 MG tablet  Commonly known as: XANAX     ARIPiprazole 10 MG tablet  Commonly known as: ABILIFY     clonazePAM 2 MG tablet  Commonly known as: KLONOPIN     prazosin 1 MG capsule  Commonly known as: MINIPRESS               Where to Get Your Medications        These medications were sent to [facility]/pharmacy #[id number] [facility], [address] [facility] -  [phone] - F [phone] [address] [id number]      Hours: 24-hours Phone: [phone]   cloNIDine 0.2 MG/24HR

## 2025-04-14 ENCOUNTER — TELEPHONE (OUTPATIENT)
Dept: URGENT CARE | Facility: CLINIC | Age: 35
End: 2025-04-14

## 2025-04-14 ENCOUNTER — OFFICE VISIT (OUTPATIENT)
Dept: URGENT CARE | Facility: CLINIC | Age: 35
End: 2025-04-14
Payer: COMMERCIAL

## 2025-04-14 VITALS
WEIGHT: 147.2 LBS | DIASTOLIC BLOOD PRESSURE: 84 MMHG | HEIGHT: 63 IN | TEMPERATURE: 98.5 F | OXYGEN SATURATION: 96 % | SYSTOLIC BLOOD PRESSURE: 119 MMHG | HEART RATE: 113 BPM | BODY MASS INDEX: 26.08 KG/M2

## 2025-04-14 DIAGNOSIS — M43.6 NECK STIFFNESS: ICD-10-CM

## 2025-04-14 DIAGNOSIS — R20.2 PARESTHESIA OF BOTH HANDS: Primary | ICD-10-CM

## 2025-04-14 DIAGNOSIS — M79.602 BILATERAL ARM PAIN: ICD-10-CM

## 2025-04-14 DIAGNOSIS — M79.601 BILATERAL ARM PAIN: ICD-10-CM

## 2025-04-14 PROCEDURE — 99215 OFFICE O/P EST HI 40 MIN: CPT | Performed by: PHYSICIAN ASSISTANT

## 2025-04-14 PROCEDURE — 3008F BODY MASS INDEX DOCD: CPT | Performed by: PHYSICIAN ASSISTANT

## 2025-04-14 RX ORDER — CLONAZEPAM 2 MG/1
2 TABLET ORAL 3 TIMES DAILY
COMMUNITY

## 2025-04-14 RX ORDER — MIDODRINE HYDROCHLORIDE 2.5 MG/1
2.5 TABLET ORAL 3 TIMES DAILY
COMMUNITY
Start: 2025-01-14

## 2025-04-14 RX ORDER — CHLORPROMAZINE HYDROCHLORIDE 200 MG/1
200 TABLET, FILM COATED ORAL DAILY
COMMUNITY

## 2025-04-14 RX ORDER — TIZANIDINE 4 MG/1
4 TABLET ORAL EVERY 8 HOURS PRN
Qty: 21 TABLET | Refills: 0 | Status: SHIPPED | OUTPATIENT
Start: 2025-04-14 | End: 2025-04-21

## 2025-04-14 RX ORDER — ALPRAZOLAM 1 MG/1
TABLET ORAL
COMMUNITY

## 2025-04-14 NOTE — PROGRESS NOTES
"Subjective   Patient ID: Tiarra Givens is a 34 y.o. female who presents for Edema.    HPI     Pt c/o swelling, pain, numbness in hands, right lateral and posterior neck, left chest x 2 weeks. Only environmental change is she is staying with a friend. Is sleeping on a mattress - not waking up stiff or sore. Has been trying IBU and Tylenol without relief. Carrying things, grasping makes Sx worse. Nothing improves the symptoms that she can tell. Right hand feels worse, though both are affected. Sx are not better/worse in AM vs PM - same throughout the day. She has gone into cardiac arrest once (2/2/25 per cardiology notes). She also has a Hx of POTS. She has a Hx of IV drug use. Per cardiology note review, she is still using methamphetamines 3-4x per week. She saw her cardiologist 4/3 because she continues with intermittent chest pain, dizziness, lightheadedness. She did an EKG at that time which was normal, but the previous EKG showed prolonged QT interval, Torsades de Pointes, and arrhythmia. States that she has tremors at baseline - she is on Xanax and Klonopin for this    Denies fever, chills, headache, SOB, abdominal pain, N/V/D, rash, swelling, bruising (aside from baseline), cough, congestion, sore throat, ear pain, vision changes.     Dr. Mohr is her PCP.     Review of Systems   All other systems reviewed and are negative.      Objective   /84 (BP Location: Left arm, Patient Position: Sitting, BP Cuff Size: Small adult)   Pulse (!) 113   Temp 36.9 °C (98.5 °F) (Oral)   Ht 1.6 m (5' 3\")   Wt 66.8 kg (147 lb 3.2 oz)   LMP 04/11/2025 (Approximate)   SpO2 96%   BMI 26.08 kg/m²     Physical Exam  Vitals reviewed.   Constitutional:       General: She is awake.      Appearance: Normal appearance. She is well-developed.   HENT:      Head: Normocephalic and atraumatic.   Cardiovascular:      Rate and Rhythm: Normal rate.   Pulmonary:      Effort: Pulmonary effort is normal.   Musculoskeletal:      " Right wrist: Normal.      Left wrist: Normal.      Right hand: Swelling (mild, tendons and bony structures not as pronounced) and tenderness (generalized over hand) present. Decreased strength of finger abduction. Normal capillary refill.      Left hand: Swelling (mild, tendons and bony structures not as pronounced) and tenderness (generalized over hand) present. Decreased strength of finger abduction. Normal capillary refill.      Cervical back: Pain with movement (tilting and turning to the left) and muscular tenderness (over the lateral and posterior right neck muscles) present. No spinous process tenderness. Decreased range of motion (tilting and turning to the left).      Right lower leg: No edema.      Left lower leg: No edema.   Skin:     General: Skin is warm and dry.      Findings: No lesion or rash.   Neurological:      General: No focal deficit present.      Mental Status: She is alert and oriented to person, place, and time.      Cranial Nerves: No facial asymmetry.      Motor: Motor function is intact.      Gait: Gait is intact.   Psychiatric:         Attention and Perception: Attention normal.         Mood and Affect: Mood and affect normal.         Assessment/Plan   Problem List Items Addressed This Visit    None  Visit Diagnoses         Codes    Paresthesia of both hands    -  Primary R20.2    Relevant Orders    CBC    Comprehensive metabolic panel    C-Reactive Protein    Sedimentation rate, automated    Rheumatoid Factor    TATI with Reflex to SLIME    Vitamin D 25-Hydroxy,Total (for eval of Vitamin D levels)    Vitamin B12    Bilateral arm pain     M79.601, M79.602    Relevant Orders    CBC    Comprehensive metabolic panel    C-Reactive Protein    Sedimentation rate, automated    Rheumatoid Factor    TATI with Reflex to SLIME    Vitamin D 25-Hydroxy,Total (for eval of Vitamin D levels)    Vitamin B12    Neck stiffness     M43.6    Relevant Medications    tiZANidine (Zanaflex) 4 mg tablet          Given  her Hx of Torsades/electrolyte imbalance, etc as well as her Sx of numbness and pain in the hands, will try Slo Mag. Do not want to Rx gabapentin due to Hx of drug use, is already on two benzodiazepines and using methamphetamine illicitly.   Tizanidine Rx, low dose. Can use for stiffness/spasm in the neck, may help with some of the arm pain.   Labs ordered as above. Discussed that this will likely take more of a workup than we can do in , will fax over office note to PCP's office with instructions to contact pt to follow up. May consider NCS.   Discussed EKG - she saw her cardiologist since this started, is being worked up for the CP. EKG 4/3/25 was normal. No changes in her chest pain. Declined EKG at this time   Declined XR orders at this time for bilateral hands.   Will contact pt with results of labs.   ER if any worsening Sx.     Red flag symptoms reviewed with patient and all questions answered. Patient or parent/guardian verbalized understanding and agreement with care plan as above. All in office testing reviewed with patient. If symptoms worsen or do not improve, patient is to follow up with PCP or report to the ER.

## 2025-04-14 NOTE — TELEPHONE ENCOUNTER
Can we please fax office note to pt's PCP Dr. Mohr with a facesheet and with a note on the fax cover sheet to please reach out to patient to schedule? His office phone # is [phone]. Did not see a fax on the website I went to, may need to call.

## 2025-04-16 ENCOUNTER — TELEPHONE (OUTPATIENT)
Dept: URGENT CARE | Facility: CLINIC | Age: 35
End: 2025-04-16
Payer: COMMERCIAL

## 2025-04-16 LAB
25(OH)D3+25(OH)D2 SERPL-MCNC: 21 NG/ML (ref 30–100)
ALBUMIN SERPL-MCNC: 4.5 G/DL (ref 3.6–5.1)
ALP SERPL-CCNC: 37 U/L (ref 31–125)
ALT SERPL-CCNC: 63 U/L (ref 6–29)
ANA SER QL IF: NEGATIVE
ANION GAP SERPL CALCULATED.4IONS-SCNC: 12 MMOL/L (CALC) (ref 7–17)
AST SERPL-CCNC: 32 U/L (ref 10–30)
BILIRUB SERPL-MCNC: 0.8 MG/DL (ref 0.2–1.2)
BUN SERPL-MCNC: 17 MG/DL (ref 7–25)
CALCIUM SERPL-MCNC: 9.2 MG/DL (ref 8.6–10.2)
CHLORIDE SERPL-SCNC: 105 MMOL/L (ref 98–110)
CO2 SERPL-SCNC: 21 MMOL/L (ref 20–32)
CREAT SERPL-MCNC: 0.68 MG/DL (ref 0.5–0.97)
CRP SERPL-MCNC: <3 MG/L
EGFRCR SERPLBLD CKD-EPI 2021: 116 ML/MIN/1.73M2
ERYTHROCYTE [DISTWIDTH] IN BLOOD BY AUTOMATED COUNT: 12.1 % (ref 11–15)
ERYTHROCYTE [SEDIMENTATION RATE] IN BLOOD BY WESTERGREN METHOD: 2 MM/H
GLUCOSE SERPL-MCNC: 127 MG/DL (ref 65–99)
HCT VFR BLD AUTO: 39.1 % (ref 35–45)
HGB BLD-MCNC: 13.1 G/DL (ref 11.7–15.5)
MCH RBC QN AUTO: 30.6 PG (ref 27–33)
MCHC RBC AUTO-ENTMCNC: 33.5 G/DL (ref 32–36)
MCV RBC AUTO: 91.4 FL (ref 80–100)
PLATELET # BLD AUTO: 328 THOUSAND/UL (ref 140–400)
PMV BLD REES-ECKER: 9.5 FL (ref 7.5–12.5)
POTASSIUM SERPL-SCNC: 4.5 MMOL/L (ref 3.5–5.3)
PROT SERPL-MCNC: 7 G/DL (ref 6.1–8.1)
RBC # BLD AUTO: 4.28 MILLION/UL (ref 3.8–5.1)
RHEUMATOID FACT SERPL-ACNC: <10 IU/ML
SODIUM SERPL-SCNC: 138 MMOL/L (ref 135–146)
VIT B12 SERPL-MCNC: 606 PG/ML (ref 200–1100)
WBC # BLD AUTO: 9.9 THOUSAND/UL (ref 3.8–10.8)

## 2025-04-16 NOTE — TELEPHONE ENCOUNTER
Called patient as she had multiple questions about her laboratory test.  I told her the ordering provider would be in tomorrow glucose was 127 with complaints of she believes polydipsia so we may order hemoglobin A1c.  Vitamin D deficiency consistent with current region that she resides in and the oncoming provider may start vitamin D supplementation although this is not urgent to be done now.  AST and ALT mildly elevated with history of hepatitis C slightly above her baseline labs she is complaining of some skin itching.  I again informed her that we may monitor this and do repeat labs in the next few weeks as to be at the discretion of the oncoming provider tomorrow.  All questions were answered she was very pleased with the phone call.

## 2025-04-17 ENCOUNTER — PATIENT MESSAGE (OUTPATIENT)
Dept: URGENT CARE | Facility: CLINIC | Age: 35
End: 2025-04-17
Payer: COMMERCIAL

## 2025-04-17 NOTE — TELEPHONE ENCOUNTER
Looks like Jacob already spoke with her. I sent her a [employer] message today with info. Did we fax the office note to her PCP as requested in earlier message?